# Patient Record
Sex: MALE | Race: WHITE | NOT HISPANIC OR LATINO | Employment: FULL TIME | URBAN - METROPOLITAN AREA
[De-identification: names, ages, dates, MRNs, and addresses within clinical notes are randomized per-mention and may not be internally consistent; named-entity substitution may affect disease eponyms.]

---

## 2017-05-08 ENCOUNTER — VIRTUAL VISIT (OUTPATIENT)
Dept: FAMILY MEDICINE | Facility: OTHER | Age: 47
End: 2017-05-08

## 2017-05-08 ENCOUNTER — OFFICE VISIT (OUTPATIENT)
Dept: FAMILY MEDICINE | Facility: CLINIC | Age: 47
End: 2017-05-08
Payer: COMMERCIAL

## 2017-05-08 VITALS
SYSTOLIC BLOOD PRESSURE: 134 MMHG | BODY MASS INDEX: 33.92 KG/M2 | WEIGHT: 264.3 LBS | DIASTOLIC BLOOD PRESSURE: 81 MMHG | HEIGHT: 74 IN | TEMPERATURE: 96.6 F

## 2017-05-08 DIAGNOSIS — J01.00 ACUTE NON-RECURRENT MAXILLARY SINUSITIS: Primary | ICD-10-CM

## 2017-05-08 PROCEDURE — 99213 OFFICE O/P EST LOW 20 MIN: CPT | Performed by: NURSE PRACTITIONER

## 2017-05-08 RX ORDER — AMOXICILLIN 875 MG
875 TABLET ORAL 2 TIMES DAILY
Qty: 20 TABLET | Refills: 0 | Status: SHIPPED | OUTPATIENT
Start: 2017-05-08 | End: 2022-05-19

## 2017-05-08 NOTE — PATIENT INSTRUCTIONS
Amoxicillin 1 tablet twice daily for 10 days, start in 2-3 days if not improving, or symptoms getting worse  Nasal saline irrigations  Claritin 10 mg daily  Mucinex  mg twice daily as needed for cough, take it with full glass of water, this will help cough and help sinuses too.

## 2017-05-08 NOTE — NURSING NOTE
"Chief Complaint   Patient presents with     Sinus Problem     4 days        Initial /81  Temp 96.6  F (35.9  C) (Tympanic)  Ht 6' 1.75\" (1.873 m)  Wt 264 lb 4.8 oz (119.9 kg)  BMI 34.16 kg/m2 Estimated body mass index is 34.16 kg/(m^2) as calculated from the following:    Height as of this encounter: 6' 1.75\" (1.873 m).    Weight as of this encounter: 264 lb 4.8 oz (119.9 kg).  Medication Reconciliation: complete  "

## 2017-05-08 NOTE — MR AVS SNAPSHOT
"              After Visit Summary   5/8/2017    Jun Mosqueda    MRN: [id number]           Patient Information     Date Of Birth          1970        Visit Information        Provider Department      5/8/2017 11:00 AM Luh Abreu APRN CNP [facility]        Today's Diagnoses     Acute non-recurrent maxillary sinusitis    -  1      Care Instructions    Amoxicillin 1 tablet twice daily for 10 days, start in 2-3 days if not improving, or symptoms getting worse  Nasal saline irrigations  Claritin 10 mg daily  Mucinex  mg twice daily as needed for cough, take it with full glass of water, this will help cough and help sinuses too.            Follow-ups after your visit        Who to contact     If you have questions or need follow up information about today's clinic visit or your schedule please contact [facility] directly at [phone].  Normal or non-critical lab and imaging results will be communicated to you by MyChart, letter or phone within 4 business days after the clinic has received the results. If you do not hear from us within 7 days, please contact the clinic through [email or web address] or phone. If you have a critical or abnormal lab result, we will notify you by phone as soon as possible.  Submit refill requests through InSpa or call your pharmacy and they will forward the refill request to us. Please allow 3 business days for your refill to be completed.          Additional Information About Your Visit        MyChart Information     InSpa lets you send messages to your doctor, view your test results, renew your prescriptions, schedule appointments and more. To sign up, go to [email or web address] . Click on \"Log in\" on the left side of the screen, which will take you to the Welcome page. Then click on \"Sign up Now\" on the right side of the page.     You will be asked to enter the access code listed below, as well as some personal information. Please follow " "the directions to create your username and password.     Your access code is: [email or web address]  Expires: 2017 11:21 AM     Your access code will  in 90 days. If you need help or a new code, please call your [place] clinic or [phone].        Care EveryWhere ID     This is your Care EveryWhere ID. This could be used by other organizations to access your [place] medical records  [id number]        Your Vitals Were     Temperature Height BMI (Body Mass Index)             96.6  F (35.9  C) (Tympanic) 6' 1.75\" (1.873 m) 34.16 kg/m2          Blood Pressure from Last 3 Encounters:   17 134/81   16 135/88   10/03/16 132/83    Weight from Last 3 Encounters:   17 264 lb 4.8 oz (119.9 kg)   16 267 lb 3.2 oz (121.2 kg)   10/03/16 263 lb 6.4 oz (119.5 kg)              Today, you had the following     No orders found for display         Today's Medication Changes          These changes are accurate as of: 17 11:21 AM.  If you have any questions, ask your nurse or doctor.               Start taking these medicines.        Dose/Directions    amoxicillin 875 MG tablet   Commonly known as:  AMOXIL   Used for:  Acute non-recurrent maxillary sinusitis   Started by:  Luh Abreu APRN CNP        Dose:  875 mg   Take 1 tablet (875 mg) by mouth 2 times daily   Quantity:  20 tablet   Refills:  0            Where to get your medicines      These medications were sent to [facility] [address] [place] [address]  [address] [facility] [address]     Phone:  [phone]     amoxicillin 875 MG tablet                Primary Care Provider Office Phone # Fax #    Emeka Zarco [phone]       [facility] [address] [facility] [address]        Thank you!     Thank you for choosing [facility]  for your care. Our goal is always to provide you with excellent care. Hearing back from our patients is one way we can continue " to improve our services. Please take a few minutes to complete the written survey that you may receive in the mail after your visit with us. Thank you!             Your Updated Medication List - Protect others around you: Learn how to safely use, store and throw away your medicines at www.disposemymeds.org.          This list is accurate as of: 5/8/17 11:21 AM.  Always use your most recent med list.                   Brand Name Dispense Instructions for use    amoxicillin 875 MG tablet    AMOXIL    20 tablet    Take 1 tablet (875 mg) by mouth 2 times daily

## 2017-05-08 NOTE — PROGRESS NOTES
"  SUBJECTIVE:                                                    Jun Mosqueda is a 47 year old male who presents to clinic today for the following health issues:    ENT Symptoms             Symptoms: cc Present Absent Comment   Fever/Chills  x  Chills, no fever    Fatigue  x     Muscle Aches  x  Last night    Eye Irritation   x    Sneezing   x    Nasal Brijesh/Drg  x  Runny nose    Sinus Pressure/Pain  x     Loss of smell  x     Dental pain   x    Sore Throat  x  burns   Swollen Glands   x    Ear Pain/Fullness  x  Both    Cough  x     Wheeze   x    Chest Pain   x    Shortness of breath   x    Rash   x    Other         Symptom duration: 5 days    Symptom severity:  moderate, feels worse today    Treatments tried: Ibuprofen   Contacts:        Problem list and histories reviewed & adjusted, as indicated.  Additional history: as documented    Labs reviewed in EPIC    Reviewed and updated as needed this visit by clinical staff  Tobacco  Allergies  Meds  Med Hx  Surg Hx  Fam Hx  Soc Hx      Reviewed and updated as needed this visit by Provider         ROS:  Constitutional, HEENT, cardiovascular, pulmonary, gi and gu systems are negative, except as otherwise noted.    OBJECTIVE:                                                    /81  Temp 96.6  F (35.9  C) (Tympanic)  Ht 6' 1.75\" (1.873 m)  Wt 264 lb 4.8 oz (119.9 kg)  BMI 34.16 kg/m2  Body mass index is 34.16 kg/(m^2).  GENERAL: healthy, alert and no distress  HENT: normal cephalic/atraumatic, ear canals and TM's normal, nasal mucosa edematous , rhinorrhea yellow and oropharynx clear  NECK: cervical anterior adenopathy   RESP: lungs clear to auscultation - no rales, rhonchi or wheezes  CV: regular rate and rhythm, normal S1 S2, no S3 or S4, no murmur, click or rub, no peripheral edema and peripheral pulses strong    Diagnostic Test Results:  none      ASSESSMENT/PLAN:                                                      1. Acute non-recurrent maxillary " sinusitis  - start amoxicillin (AMOXIL) 875 MG tablet; Take 1 tablet (875 mg) by mouth 2 times daily if no improvement with symptomatic treatment Dispense: 20 tablet; Refill: 0  -nasal saline irrigations  -Mucinex  mg twice daily as needed   -Tylenol 500 mg 4 times daily as needed for pain    See Patient Instructions    ROSLYN Chávez [facility]

## 2017-05-09 NOTE — PROGRESS NOTES
"Date:   Clinician: Joel Wegener  Clinician NPI: [id number]  Patient: Jun Glez  Patient : 1970  Patient Address: [address] [place] [address]  Patient Phone: [phone]  Visit Protocol: URI  Patient Summary:  Jun is a 47 year old ( : 1970 ) male who initiated a Visit for a presumed sinus infection. When asked the question \"Please sign me up to receive news, health information and promotions. \", Jun responded \"No\".     His symptoms started gradually 3-6 days ago and consist of rhinitis, cough, dysphagia, chills, malaise, ear pain, hoarse voice, nasal congestion, post-nasal drainage, and sore throat.   He denies myalgias, petechial or purpuric rash, loss of appetite, fever, dyspnea, vomiting, nausea, chest pain, headache, and itchy eyes. He denies a history of facial surgery.   His minimal nasal secretions are green. His mild facial pain or pressure feels worse when bending over or leaning forward and is located on both sides of his head. The facial pain or pressure started after the onset of other URI symptoms.  He has teeth pain and is confident the tooth pain is not from a cavity, recent dental work or other mouth problems.   In the past year Jun has been diagnosed with one (1) episodes of sinusitis.   He has a mildly painful sore throat. When Jun swallows liquids or saliva, he experiences moderate pain. The patient denies having white spots on the tonsils similar to a sample strep throat image provided. He has not been exposed to Strep. When asked to feel his neck he denied feeling enlarged lymph nodes. He denies axillary lymphadenopathy.   Regarding the ear pain, the patient denies recent injury to the area around the ear.   He reports having mild ear pain on the ear canal area of both ears for 2-4 days. The patient hears normally despite the ear pain.   In addition to the ear pain, Jun also reports having the following symptoms:      Swelling located on " his ear canal     A feeling of fullness in the ear as if it is clogged    Tinnitus     Jun denies having redness and tenderness on his ear.   Additionally, he finds the pain is worse while eating or chewing, experiences pain when gently pulling on the earlobe, finds the pain worsens if the mouth is open fully or the teeth are clenched, and does not experience pain when bending the chin to the chest.   He has never had tympanostomy tube placement.    His mild (a few coughs/hr) productive cough is NOT more bothersome at night. He believes the cough is caused by post-nasal drainage. His cough produces green sputum.   His highest temperature was 98.4 degrees Fahrenheit and his current temperature is 98.4 degrees Fahrenheit. He used the oral method for measuring his temperature.   He has passed urine in the past 12 hours. He denies rigors.   Jun denies having COPD or other chronic lung disease.   Pulse: self-reported pulse rate as: 13 beats in 10 seconds.   Current Temperature (F): 98.4     Weight (in lbs): 255   Jun does not smoke or use smokeless tobacco.    MEDICATIONS:  No current medications , ALLERGIES:  NKDA   Clinician Response:  Dear Jun,  Based on the information you have provided, you likely have a viral upper respiratory infection, otherwise known as a 'cold'. Based on the information you have provided, you likely have viral sinusitis  commonly called a head cold. Based on the information you provided, you have Otalgia, otherwise known as ear pain. If your symptoms do not improve in the next 1-2 days, please be seen in a clinic or urgent care to determine the cause of your ear pain and the best treatment plan for you.   I am prescribing benzonatate (Tessalon Perles) 100 mg to treat your cough. Take one to two tablets by mouth three times a day as needed for cough. There are no refills with this prescription.   Sinus pressure occurs when the tissues lining your sinuses become swollen and inflamed.  Afrin nasal spray decreases the swelling to provide the quickest and most effective relief from sinus pressure.  Use oxymetazoline (Afrin, or store brand) nasal spray. Spray once in each nostril twice per day for a maximum of 3 days. Using this medication more frequently or longer than recommended may cause nasal congestion to reoccur or worsen. This is an over-the-counter medication you can find at most pharmacies.   Unless your are allergic to the over-the-counter medication(s) below, I recommend using:   Ibuprofen. Take 1-3 200mg tablets (200-600mg) every 8 hours to help with the discomfort. Make sure to take the ibuprofen with food. Do not exceed 2400mg in 24 hours. This is an over-the-counter medication that does not require a prescription.   Try the following to help with your throat pain and discomfort:     Use throat lozenges    Gargle with warm salt water (1/4 teaspoon of salt per 8 ounce glass of water).    Suck on frozen items such as Popsicles or ice cubes.     Call 911 or go to the emergency room if you feel that your throat is closing off, you suddenly develop a rash, you are unable to swallow fluids, you are drooling, or you are having difficulty breathing.  Follow up with your primary care provider if your symptoms are not improving in 3-4 days.   Mild ear pain or pressure is common when you have an upper respiratory infection. The pain is caused by fluid and inflammation in your sinus passages. If your ear pain persists more than 3 days or if you notice drainage from your ears, please be seen in a clinic to get your ears examined.   Because your condition is most likely caused by a virus, antibiotics will not help you get better. Inappropriately treating a viral infection with antibiotics may cause harmful side-effects. In fact, antibiotics may make you feel worse.  For more information on why I am not prescribing antibiotics, please watch this video: Antibiotics Won't Help You.   Your symptoms are  consistent with a possible sinus infection. Most sinus infections are caused by viruses and will resolve in the next few days without antibiotics. Antibiotics are only recommended if your sinus infection is accompanied by a high temperature or persists longer than 10 days.  The duration of your sinus symptoms do not meet the criteria for a bacterial infection or antibiotic treatment. However, if you continue to have sinus pain and pressure longer than 10 days, please consider doing another visit with us for additional evaluation and treatment recommendations.   Drink plenty of liquids, especially water and take time to rest your body. This may mean taking a nap or going to bed earlier. Your body is fighting an infection and liquids and rest will improve the pace of recovery. Remember to regularly wash your hands and avoid close contact with others to prevent spreading your infection.   Diagnosis: Viral Sinusitis  Diagnosis ICD: J01.90  Prescription: benzonatate (Tessalon Perles) 100mg oral tablet 30 tablets, 5 days supply. Take one to two tablets by mouth three times a day as needed. disp. 30. Refills: 0, Refill as needed: no, Allow substitutions: yes

## 2021-01-03 ENCOUNTER — HOSPITAL ENCOUNTER (EMERGENCY)
Facility: CLINIC | Age: 51
Discharge: HOME | End: 2021-01-03
Attending: EMERGENCY MEDICINE | Admitting: EMERGENCY MEDICINE
Payer: COMMERCIAL

## 2021-01-03 VITALS
HEART RATE: 80 BPM | BODY MASS INDEX: 30.76 KG/M2 | TEMPERATURE: 96.7 F | OXYGEN SATURATION: 97 % | DIASTOLIC BLOOD PRESSURE: 76 MMHG | SYSTOLIC BLOOD PRESSURE: 110 MMHG | RESPIRATION RATE: 8 BRPM | WEIGHT: 238 LBS

## 2021-01-03 DIAGNOSIS — R20.2 PARESTHESIA OF BOTH HANDS: ICD-10-CM

## 2021-01-03 DIAGNOSIS — R03.0 ELEVATED BLOOD PRESSURE READING WITHOUT DIAGNOSIS OF HYPERTENSION: ICD-10-CM

## 2021-01-03 DIAGNOSIS — R00.2 PALPITATIONS: ICD-10-CM

## 2021-01-03 LAB
ALBUMIN SERPL-MCNC: 4.2 G/DL (ref 3.4–5)
ALP SERPL-CCNC: 110 U/L (ref 40–150)
ALT SERPL W P-5'-P-CCNC: 36 U/L (ref 0–70)
ANION GAP SERPL CALCULATED.3IONS-SCNC: 4 MMOL/L (ref 3–14)
AST SERPL W P-5'-P-CCNC: 23 U/L (ref 0–45)
BASOPHILS # BLD AUTO: 0.1 10E9/L (ref 0–0.2)
BASOPHILS NFR BLD AUTO: 0.7 %
BILIRUB SERPL-MCNC: 0.5 MG/DL (ref 0.2–1.3)
BUN SERPL-MCNC: 22 MG/DL (ref 7–30)
CALCIUM SERPL-MCNC: 9.2 MG/DL (ref 8.5–10.1)
CHLORIDE SERPL-SCNC: 107 MMOL/L (ref 94–109)
CO2 SERPL-SCNC: 28 MMOL/L (ref 20–32)
CREAT SERPL-MCNC: 1.16 MG/DL (ref 0.66–1.25)
DIFFERENTIAL METHOD BLD: NORMAL
EOSINOPHIL # BLD AUTO: 0.1 10E9/L (ref 0–0.7)
EOSINOPHIL NFR BLD AUTO: 0.7 %
ERYTHROCYTE [DISTWIDTH] IN BLOOD BY AUTOMATED COUNT: 12 % (ref 10–15)
GFR SERPL CREATININE-BSD FRML MDRD: 73 ML/MIN/{1.73_M2}
GLUCOSE SERPL-MCNC: 99 MG/DL (ref 70–99)
HCT VFR BLD AUTO: 44.5 % (ref 40–53)
HGB BLD-MCNC: 15.5 G/DL (ref 13.3–17.7)
IMM GRANULOCYTES # BLD: 0 10E9/L (ref 0–0.4)
IMM GRANULOCYTES NFR BLD: 0.1 %
LYMPHOCYTES # BLD AUTO: 2 10E9/L (ref 0.8–5.3)
LYMPHOCYTES NFR BLD AUTO: 26 %
MAGNESIUM SERPL-MCNC: 1.9 MG/DL (ref 1.6–2.3)
MCH RBC QN AUTO: 32.6 PG (ref 26.5–33)
MCHC RBC AUTO-ENTMCNC: 34.8 G/DL (ref 31.5–36.5)
MCV RBC AUTO: 94 FL (ref 78–100)
MONOCYTES # BLD AUTO: 1.1 10E9/L (ref 0–1.3)
MONOCYTES NFR BLD AUTO: 14.1 %
NEUTROPHILS # BLD AUTO: 4.5 10E9/L (ref 1.6–8.3)
NEUTROPHILS NFR BLD AUTO: 58.4 %
NRBC # BLD AUTO: 0 10*3/UL
NRBC BLD AUTO-RTO: 0 /100
PLATELET # BLD AUTO: 184 10E9/L (ref 150–450)
POTASSIUM SERPL-SCNC: 3.6 MMOL/L (ref 3.4–5.3)
PROT SERPL-MCNC: 8.3 G/DL (ref 6.8–8.8)
RBC # BLD AUTO: 4.76 10E12/L (ref 4.4–5.9)
SODIUM SERPL-SCNC: 139 MMOL/L (ref 133–144)
TROPONIN I SERPL-MCNC: <0.015 UG/L (ref 0–0.04)
TSH SERPL DL<=0.005 MIU/L-ACNC: 3.3 MU/L (ref 0.4–4)
WBC # BLD AUTO: 7.7 10E9/L (ref 4–11)

## 2021-01-03 PROCEDURE — 84484 ASSAY OF TROPONIN QUANT: CPT | Performed by: EMERGENCY MEDICINE

## 2021-01-03 PROCEDURE — 85025 COMPLETE CBC W/AUTO DIFF WBC: CPT | Performed by: EMERGENCY MEDICINE

## 2021-01-03 PROCEDURE — 80053 COMPREHEN METABOLIC PANEL: CPT | Performed by: EMERGENCY MEDICINE

## 2021-01-03 PROCEDURE — 93005 ELECTROCARDIOGRAM TRACING: CPT | Performed by: EMERGENCY MEDICINE

## 2021-01-03 PROCEDURE — 84443 ASSAY THYROID STIM HORMONE: CPT | Performed by: EMERGENCY MEDICINE

## 2021-01-03 PROCEDURE — 99285 EMERGENCY DEPT VISIT HI MDM: CPT | Mod: 25 | Performed by: EMERGENCY MEDICINE

## 2021-01-03 PROCEDURE — 83735 ASSAY OF MAGNESIUM: CPT | Performed by: EMERGENCY MEDICINE

## 2021-01-03 PROCEDURE — 99284 EMERGENCY DEPT VISIT MOD MDM: CPT | Performed by: EMERGENCY MEDICINE

## 2021-01-03 PROCEDURE — 93010 ELECTROCARDIOGRAM REPORT: CPT | Performed by: EMERGENCY MEDICINE

## 2021-01-03 RX ORDER — CLONIDINE HYDROCHLORIDE 0.1 MG/1
0.1 TABLET ORAL ONCE
Status: DISCONTINUED | OUTPATIENT
Start: 2021-01-03 | End: 2021-01-03 | Stop reason: HOSPADM

## 2021-01-03 NOTE — ED AVS SNAPSHOT
[facility]  [address] [facility] [address]  Phone: [phone]  Fax: [phone]                                    Jun Mosqueda   MRN: [id number]    Department: [facility]   Date of Visit: 1/3/2021           After Visit Summary Signature Page    I have received my discharge instructions, and my questions have been answered. I have discussed any challenges I see with this plan with the nurse or doctor.    ..........................................................................................................................................  Patient/Patient Representative Signature      ..........................................................................................................................................  Patient Representative Print Name and Relationship to Patient    ..................................................               ................................................  Date                                   Time    ..........................................................................................................................................  Reviewed by Signature/Title    ...................................................              ..............................................  Date                                               Time          22EPIC Rev 08/18

## 2021-01-03 NOTE — ED PROVIDER NOTES
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 this morning, and which was found to be associated with elevated blood pressure today.  He reports that he developed a sensation of being able to hear his heartbeat with a vague fluttery palpitations and sensation of \"whooshing\" from the chest area inferiorly throughout his body, associated with bilateral hand numbness/tingling and sweating.  This was associated with feeling anxious and when he checked his blood pressure they were elevated with systolic -195 and diastolic -115.  A 3-lead EKG study was performed and showed normal sinus rhythm.  I reviewed this and he had normal sinus rhythm with heart rate approxionely 100.  He is reports heart rate to as high as 120.  He has had no syncope, shortness of breath, cough, hemoptysis or leg pain or leg swelling.  No history of exertionally induced symptoms or chest pain.  He reports a similar brief episode several days ago, again without syncope.  He thinks that he may have PTSD.  No history of anxiety or anxiety attacks.  He denies cardiac risk factors.  No prior history of hypertension.    Allergies:  No Known Allergies    Problem List:    Patient Active Problem List    Diagnosis Date Noted     Acute appendicitis with peritoneal abscess 07/09/2013     Priority: Medium     S/P laparoscopic appendectomy 06/25/2013     Priority: Medium        Past Medical History:    No past medical history on file.    Past Surgical History:    Past Surgical History:   Procedure Laterality Date     LAPAROSCOPIC APPENDECTOMY  6/25/2013    Procedure: LAPAROSCOPIC APPENDECTOMY;  herniorrphory;  Surgeon: Philly Lynn MD;  " Location: [facility] OR       Family History:    No family history on file.    Social History:  Marital Status:   [2]  Social History     Tobacco Use     Smoking status: Never Smoker   Substance Use Topics     Alcohol use: Yes     Drug use: No        Medications:         amoxicillin (AMOXIL) 875 MG tablet          Review of Systems  As mentioned above in the history present illness.  All other systems were reviewed and are negative.    Physical Exam   BP: (!) 152/101  Pulse: 105  Temp: 96.7  F (35.9  C)  Resp: 20  Weight: 108 kg (238 lb)  SpO2: 99 %      Physical Exam  Vitals signs and nursing note reviewed.   Constitutional:       General: He is not in acute distress.     Appearance: Normal appearance. He is well-developed. He is not ill-appearing or diaphoretic.   HENT:      Head: Normocephalic and atraumatic.      Right Ear: External ear normal.      Left Ear: External ear normal.   Eyes:      General: No scleral icterus.     Extraocular Movements: Extraocular movements intact.      Conjunctiva/sclera: Conjunctivae normal.   Neck:      Musculoskeletal: Normal range of motion and neck supple.      Trachea: No tracheal deviation.   Cardiovascular:      Rate and Rhythm: Normal rate and regular rhythm.      Pulses: Normal pulses.      Heart sounds: Normal heart sounds. No murmur. No friction rub. No gallop.    Pulmonary:      Effort: Pulmonary effort is normal. No tachypnea or respiratory distress.      Breath sounds: Normal breath sounds. No stridor. No decreased breath sounds, wheezing, rhonchi or rales.   Abdominal:      General: There is no distension.      Palpations: Abdomen is soft.      Tenderness: There is no abdominal tenderness.   Musculoskeletal: Normal range of motion.         General: No tenderness.      Right lower leg: No edema.      Left lower leg: No edema.   Skin:     General: Skin is warm and dry.      Coloration: Skin is not pale.      Findings: No erythema or rash.   Neurological:      General:  No focal deficit present.      Mental Status: He is alert and oriented to person, place, and time.      Coordination: Coordination normal.   Psychiatric:         Behavior: Behavior normal.      Comments: Anxious affect.         ED Course        Procedures               EKG Interpretation:    Interpreted by Jaskaran Iverson MD  Time reviewed: Upon completion  Symptoms at time of EKG: Palpitations  Rhythm: normal sinus   Rate: normal  Axis: normal  Ectopy: none  Conduction: normal  ST Segments/ T Waves: No ST-T wave changes  Q Waves: none  Comparison to prior: No old EKG available  Clinical Impression: normal EKG         Results for orders placed or performed during the hospital encounter of 01/03/21 (from the past 24 hour(s))   CBC with platelets differential   Result Value Ref Range    WBC 7.7 4.0 - 11.0 10e9/L    RBC Count 4.76 4.4 - 5.9 10e12/L    Hemoglobin 15.5 13.3 - 17.7 g/dL    Hematocrit 44.5 40.0 - 53.0 %    MCV 94 78 - 100 fl    MCH 32.6 26.5 - 33.0 pg    MCHC 34.8 31.5 - 36.5 g/dL    RDW 12.0 10.0 - 15.0 %    Platelet Count 184 150 - 450 10e9/L    Diff Method Automated Method     % Neutrophils 58.4 %    % Lymphocytes 26.0 %    % Monocytes 14.1 %    % Eosinophils 0.7 %    % Basophils 0.7 %    % Immature Granulocytes 0.1 %    Nucleated RBCs 0 0 /100    Absolute Neutrophil 4.5 1.6 - 8.3 10e9/L    Absolute Lymphocytes 2.0 0.8 - 5.3 10e9/L    Absolute Monocytes 1.1 0.0 - 1.3 10e9/L    Absolute Eosinophils 0.1 0.0 - 0.7 10e9/L    Absolute Basophils 0.1 0.0 - 0.2 10e9/L    Abs Immature Granulocytes 0.0 0 - 0.4 10e9/L    Absolute Nucleated RBC 0.0    Comprehensive metabolic panel   Result Value Ref Range    Sodium 139 133 - 144 mmol/L    Potassium 3.6 3.4 - 5.3 mmol/L    Chloride 107 94 - 109 mmol/L    Carbon Dioxide 28 20 - 32 mmol/L    Anion Gap 4 3 - 14 mmol/L    Glucose 99 70 - 99 mg/dL    Urea Nitrogen 22 7 - 30 mg/dL    Creatinine 1.16 0.66 - 1.25 mg/dL    GFR Estimate 73 >60 mL/min/[1.73_m2]    GFR  Estimate If Black 85 >60 mL/min/[1.73_m2]    Calcium 9.2 8.5 - 10.1 mg/dL    Bilirubin Total 0.5 0.2 - 1.3 mg/dL    Albumin 4.2 3.4 - 5.0 g/dL    Protein Total 8.3 6.8 - 8.8 g/dL    Alkaline Phosphatase 110 40 - 150 U/L    ALT 36 0 - 70 U/L    AST 23 0 - 45 U/L   Troponin I   Result Value Ref Range    Troponin I ES <0.015 0.000 - 0.045 ug/L   Magnesium   Result Value Ref Range    Magnesium 1.9 1.6 - 2.3 mg/dL   TSH with free T4 reflex   Result Value Ref Range    TSH 3.30 0.40 - 4.00 mU/L       Medications   cloNIDine (CATAPRES) tablet 0.1 mg (0 mg Oral Hold 1/3/21 9316)       Assessments & Plan (with Medical Decision Making)   50-year-old male with no prior history of cardiac disease or known risk factors for cardiac disease, hypertension or prior history of anxiety who had an episode of palpitations, bilateral hand paresthesias and anxiety associated with elevated blood pressure today.  EKG troponin and laboratory evaluation unremarkable. Blood pressure normalized spontaneously.  He appears stable for discharge home with no dysrhythmia or ectopy seen on cardiac monitoring in the emergency department. ?  Anxiety induced symptoms.  Doubt atypical ACS, PE/DVT, aneurysm/dissection or ventricular dysrhythmia or emergent disease process.  Will arrange for an outpatient Holter monitor study and have him follow-up in primary care clinic this coming week.  He return for worsening or recurrent symptoms, or new problems or concerns.    I have reviewed the nursing notes.    I have reviewed the findings, diagnosis, plan and need for follow up with the patient    New Prescriptions    No medications on file       Final diagnoses:   Palpitations   Paresthesia of both hands   Elevated blood pressure reading without diagnosis of hypertension       1/3/2021   [facility] EMERGENCY DEPT         Jaskaran Iverson MD  01/03/21 1423

## 2021-01-03 NOTE — ED NOTES
"Pt presents to ED with complaints of chest pain.  Pt states that it is an \"ache\" rates it at a 3/10.  Had an episode of SOA earlier.  None at this time.  Pt is A&Ox4.  HTN upon arrival.  VSS. No cardiac history.   "

## 2021-01-05 ENCOUNTER — HOSPITAL ENCOUNTER (OUTPATIENT)
Dept: CARDIOLOGY | Facility: CLINIC | Age: 51
Discharge: HOME | End: 2021-01-05
Attending: EMERGENCY MEDICINE | Admitting: EMERGENCY MEDICINE
Payer: COMMERCIAL

## 2021-01-05 DIAGNOSIS — R00.2 PALPITATIONS: ICD-10-CM

## 2021-01-05 PROCEDURE — 93227 XTRNL ECG REC<48 HR R&I: CPT | Performed by: INTERNAL MEDICINE

## 2021-01-05 PROCEDURE — 93225 XTRNL ECG REC<48 HRS REC: CPT

## 2022-05-09 ENCOUNTER — HOSPITAL ENCOUNTER (EMERGENCY)
Facility: CLINIC | Age: 52
Discharge: HOME | End: 2022-05-09
Attending: FAMILY MEDICINE | Admitting: FAMILY MEDICINE
Payer: COMMERCIAL

## 2022-05-09 VITALS
TEMPERATURE: 98.4 F | WEIGHT: 256 LBS | HEART RATE: 92 BPM | DIASTOLIC BLOOD PRESSURE: 115 MMHG | OXYGEN SATURATION: 98 % | RESPIRATION RATE: 20 BRPM | SYSTOLIC BLOOD PRESSURE: 169 MMHG | HEIGHT: 74 IN | BODY MASS INDEX: 32.85 KG/M2

## 2022-05-09 DIAGNOSIS — S39.012A LOW BACK STRAIN, INITIAL ENCOUNTER: ICD-10-CM

## 2022-05-09 DIAGNOSIS — R03.0 ELEVATED BLOOD PRESSURE READING WITHOUT DIAGNOSIS OF HYPERTENSION: ICD-10-CM

## 2022-05-09 PROCEDURE — 99284 EMERGENCY DEPT VISIT MOD MDM: CPT | Performed by: FAMILY MEDICINE

## 2022-05-09 PROCEDURE — 99283 EMERGENCY DEPT VISIT LOW MDM: CPT | Performed by: FAMILY MEDICINE

## 2022-05-09 RX ORDER — SULINDAC 200 MG/1
200 TABLET ORAL 2 TIMES DAILY WITH MEALS
Qty: 20 TABLET | Refills: 0 | Status: SHIPPED | OUTPATIENT
Start: 2022-05-09 | End: 2022-05-19

## 2022-05-09 RX ORDER — CYCLOBENZAPRINE HCL 10 MG
10 TABLET ORAL
Qty: 10 TABLET | Refills: 0 | Status: SHIPPED | OUTPATIENT
Start: 2022-05-09 | End: 2022-05-09

## 2022-05-09 RX ORDER — CYCLOBENZAPRINE HCL 10 MG
10 TABLET ORAL
Qty: 10 TABLET | Refills: 0 | Status: SHIPPED | OUTPATIENT
Start: 2022-05-09 | End: 2022-05-19

## 2022-05-09 NOTE — ED TRIAGE NOTES
Back pain started this am has worsened denies bowel or bladder issues denies numbness or tingling      Triage Assessment     Row Name 05/09/22 1634       Triage Assessment (Adult)    Airway WDL WDL       Respiratory WDL    Respiratory WDL WDL       Skin Circulation/Temperature WDL    Skin Circulation/Temperature WDL WDL       Cardiac WDL    Cardiac WDL WDL       Peripheral/Neurovascular WDL    Peripheral Neurovascular WDL WDL       Cognitive/Neuro/Behavioral WDL    Cognitive/Neuro/Behavioral WDL WDL

## 2022-05-09 NOTE — DISCHARGE INSTRUCTIONS
ICD-10-CM    1. Low back strain, initial encounter  S39.012A Primary Care Referral    take sulindac twice daiuly for the next few days. tylenol 1000 mg every 6 hours as needed. do not use sulindac with ibuprofen or similar other nsaids.  maintaiun range of motion., note for work.  Follow-up primary provider.  [facility].  consider macenzie heal your own back.  return for numb inner thigh, foot drop, incontinence/retention urine/stool.   2. Elevated blood pressure reading without diagnosis of hypertension  R03.0 Primary Care Referral    eval in clinic. recheck at home and log bring to follow-yup.  follow DASH diet. consider sleep apnea, decrease alcohoi.  consider labs and med

## 2022-05-09 NOTE — ED NOTES
Getting ready for work this morning & felt a pop to right lower back with pain following after. Denies paresthesias or pain down leg. Denies changes/ loss of B&B.

## 2022-05-09 NOTE — ED PROVIDER NOTES
History     Chief Complaint   Patient presents with     Back Pain     HPI  Jun Glez is a 52 year old male who presents with a history of appendicitis and peritoneal abscess.  2013.  Arrives here with back pain.  No associated bowel or bladder changes no associated numbness or paresthesias.  Onset this morning.  Seen last for back pain in 2015.    He tells me that this morning he was inverting his head up with his leg up when he had a sudden pop in his right low back.  There was no radicular symptoms into the legs.  No pain into the legs.  No paresthesias.  Since then extension is more painful but rotation lateral bending and forward flexion is not severe.  He had no falls.  No recent heavy lifting at work.  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  But yesterday he was at home.     He was triaged to the emergency department because of elevated blood pressure up to 180.  He tells me that his PTSD symptoms are worse today and the pain is also exacerbating his pain.  His typical blood pressures in the 140s over 80s diastolic.  He tells me he also does have snoring has thought he might have sleep apnea.  He does use excessive alcohol as well.  He is not on antihypertensives.  He has no secondary symptoms related to his elevated blood pressure.  There is no chest pain shortness of breath palpitations or other systemic symptoms.      Allergies:  No Known Allergies    Problem List:    Patient Active Problem List    Diagnosis Date Noted     Acute appendicitis with peritoneal abscess 07/09/2013     Priority: Medium     S/P laparoscopic appendectomy 06/25/2013     Priority: Medium        Past Medical History:    No past medical history on file.    Past Surgical History:    Past Surgical History:   Procedure Laterality Date     LAPAROSCOPIC APPENDECTOMY  6/25/2013    Procedure: LAPAROSCOPIC APPENDECTOMY;  herniorrphory;  Surgeon: Philly Lynn MD;  Location: [facility] OR       Family History:    No family history  "on file.    Social History:  Marital Status:   [2]  Social History     Tobacco Use     Smoking status: Never Smoker   Substance Use Topics     Alcohol use: Yes     Drug use: No        Medications:    amoxicillin (AMOXIL) 875 MG tablet          Review of Systems     ROS:  5 point ROS negative except as noted above in HPI, including Gen., Resp., CV, GI &  system review.      Physical Exam   BP: (!) 173/113  Pulse: 90  Temp: 98.4  F (36.9  C)  Resp: 20  Height: 188 cm (6' 2\")  Weight: 116.1 kg (256 lb)  SpO2: 96 %      Physical Exam    Low back exam reveals tenderness to palpation over the left dorsi region right side.  There is no midline tenderness to palpation.  Forward flexion is normal he tells me but does not go through with motion.  He has difficulty with full extension.  Lateral bending and rotation are witnessed and they are not reduced.  He has no tenderness palpation over the SI joint or over the sciatic notch.  1 legged extension is not done fully but does not exacerbate pain.  Figure-of-four testing also is negative.  Straight leg raise is negative.  Reflexes are 2+ at bilateral patella.  He has full motor strength lower extremities.  Intact distal sensation.    His abdomen is nontender.  His lungs are clear to auscultation.    ED Course                 Procedures              Critical Care time:  none               No results found for this or any previous visit (from the past 24 hour(s)).    Medications - No data to display     Patient Vitals for the past 24 hrs:   BP Temp Temp src Pulse Resp SpO2 Height Weight   05/09/22 1840 (!) 169/115 -- -- 92 -- 98 % -- --   05/09/22 1810 (!) 187/105 -- -- 89 -- -- -- --   05/09/22 1805 (!) 174/112 -- -- -- -- -- -- --   05/09/22 1735 (!) 187/109 -- -- 93 -- 97 % -- --   05/09/22 1633 (!) 173/113 98.4  F (36.9  C) Oral 90 20 96 % 1.88 m (6' 2\") 116.1 kg (256 lb)         Assessments & Plan (with Medical Decision Making)       MDM: Jun Glez is a 52 " year old male presenting with low back pain after change in position at home.  Reassuring history and exam today most suggestive of musculoskeletal cause.  Discussed precautions for return.  We did discuss also the elevated blood pressures and need for recheck in clinic.  However blood pressure today is incidental, and this is asymptomatic hypertension.    I have reviewed the nursing notes.    I have reviewed the findings, diagnosis, plan and need for follow up with the patient.       New Prescriptions    No medications on file       Final diagnoses:   Low back strain, initial encounter - take sulindac twice daiuly for the next few days. tylenol 1000 mg every 6 hours as needed. do not use sulindac with ibuprofen or similar other nsaids.  maintaiun range of motion., note for work.  Follow-up primary provider.  [facility].  consider macenzie heal your own back.  return for numb inner thigh, foot drop, incontinence/retention urine/stool.   Elevated blood pressure reading without diagnosis of hypertension - eval in clinic. recheck at home and log bring to follow-yup.  follow DASH diet. consider sleep apnea, decrease alcohoi.  consider labs and med       5/9/2022   [facility] EMERGENCY DEPT     Randy Booth MD  05/10/22 4355

## 2022-05-09 NOTE — Clinical Note
Jun Glez was seen and treated in our emergency department on 5/9/2022.  He may return to work on 05/11/2022.  no lifting >10 pounds, no push poull >20 lb, avoid bending, stooping, or overhead work.  re-eval in clinic within 7 days if restrictions need to be extended - otherwisde no restrictions after 7 days. change position for comfort.     If you have any questions or concerns, please don't hesitate to call.      Randy Booth MD

## 2022-05-19 ENCOUNTER — OFFICE VISIT (OUTPATIENT)
Dept: FAMILY MEDICINE | Facility: CLINIC | Age: 52
End: 2022-05-19
Payer: COMMERCIAL

## 2022-05-19 VITALS
HEART RATE: 90 BPM | TEMPERATURE: 97.8 F | DIASTOLIC BLOOD PRESSURE: 80 MMHG | RESPIRATION RATE: 16 BRPM | OXYGEN SATURATION: 97 % | BODY MASS INDEX: 35.34 KG/M2 | WEIGHT: 275.4 LBS | SYSTOLIC BLOOD PRESSURE: 112 MMHG | HEIGHT: 74 IN

## 2022-05-19 DIAGNOSIS — S39.012A LOW BACK STRAIN, INITIAL ENCOUNTER: Primary | ICD-10-CM

## 2022-05-19 DIAGNOSIS — R03.0 ELEVATED BLOOD PRESSURE READING WITHOUT DIAGNOSIS OF HYPERTENSION: ICD-10-CM

## 2022-05-19 DIAGNOSIS — Z23 NEED FOR VACCINATION: ICD-10-CM

## 2022-05-19 DIAGNOSIS — Z11.59 NEED FOR HEPATITIS C SCREENING TEST: ICD-10-CM

## 2022-05-19 DIAGNOSIS — F41.1 GAD (GENERALIZED ANXIETY DISORDER): ICD-10-CM

## 2022-05-19 DIAGNOSIS — Z11.4 SCREENING FOR HIV (HUMAN IMMUNODEFICIENCY VIRUS): ICD-10-CM

## 2022-05-19 DIAGNOSIS — Z13.220 SCREENING FOR HYPERLIPIDEMIA: ICD-10-CM

## 2022-05-19 DIAGNOSIS — Z12.11 SCREEN FOR COLON CANCER: ICD-10-CM

## 2022-05-19 DIAGNOSIS — F43.10 PTSD (POST-TRAUMATIC STRESS DISORDER): ICD-10-CM

## 2022-05-19 PROCEDURE — 96127 BRIEF EMOTIONAL/BEHAV ASSMT: CPT | Performed by: FAMILY MEDICINE

## 2022-05-19 PROCEDURE — 90714 TD VACC NO PRESV 7 YRS+ IM: CPT | Performed by: FAMILY MEDICINE

## 2022-05-19 PROCEDURE — 99204 OFFICE O/P NEW MOD 45 MIN: CPT | Mod: 25 | Performed by: FAMILY MEDICINE

## 2022-05-19 PROCEDURE — 90471 IMMUNIZATION ADMIN: CPT | Performed by: FAMILY MEDICINE

## 2022-05-19 RX ORDER — BUSPIRONE HYDROCHLORIDE 15 MG/1
15 TABLET ORAL 2 TIMES DAILY
COMMUNITY
Start: 2022-04-27 | End: 2022-06-09

## 2022-05-19 RX ORDER — CLONAZEPAM 0.5 MG/1
TABLET ORAL
COMMUNITY
Start: 2022-02-09 | End: 2022-09-13 | Stop reason: ALTCHOICE

## 2022-05-19 ASSESSMENT — PATIENT HEALTH QUESTIONNAIRE - PHQ9
SUM OF ALL RESPONSES TO PHQ QUESTIONS 1-9: 14
SUM OF ALL RESPONSES TO PHQ QUESTIONS 1-9: 14
10. IF YOU CHECKED OFF ANY PROBLEMS, HOW DIFFICULT HAVE THESE PROBLEMS MADE IT FOR YOU TO DO YOUR WORK, TAKE CARE OF THINGS AT HOME, OR GET ALONG WITH OTHER PEOPLE: SOMEWHAT DIFFICULT

## 2022-05-19 ASSESSMENT — PAIN SCALES - GENERAL: PAINLEVEL: NO PAIN (0)

## 2022-05-19 NOTE — PROGRESS NOTES
"  Assessment & Plan     Low back strain, initial encounter  Resolved per patient.  Advised safe and appropriate use of cyclobenzprine. He said he did not need a refill.  Activity as tolerated.  Advised spasms may recur intemittently in next few weeks.  Return precautions discussed and given to patient.   - Primary Care Referral    Elevated blood pressure reading without diagnosis of hypertension  Within goal on recheck.  Advised salt restriction.  Return precautions discussed and given to patient.   - Primary Care Referral    PTSD (post-traumatic stress disorder)  JULIENNE (generalized anxiety disorder)  Recurrent symptoms still.  Patient requested referral to [facility] mental health care team  - Adult Mental Health  Referral      - Adult Mental Health  Referral    Screen for colon cancer  Screening for hyperlipidemia  Screening for HIV (human immunodeficiency virus)  Need for hepatitis C screening test  Patient preferred to shcedule a wellness visit to address these.    Need for vaccination  - TD PRSERV FREE >=7 YRS ADS IM [[id number]]  56}     BMI:   Estimated body mass index is 35.36 kg/m  as calculated from the following:    Height as of this encounter: 1.88 m (6' 2\").    Weight as of this encounter: 124.9 kg (275 lb 6.4 oz).   Weight management plan: Discussed healthy diet and exercise guidelines    Depression Screening Follow Up    PHQ 5/19/2022   PHQ-9 Total Score 14   Q9: Thoughts of better off dead/self-harm past 2 weeks Not at all     Last PHQ-9 5/19/2022   1.  Little interest or pleasure in doing things 2   2.  Feeling down, depressed, or hopeless 2   3.  Trouble falling or staying asleep, or sleeping too much 3   4.  Feeling tired or having little energy 2   5.  Poor appetite or overeating 1   6.  Feeling bad about yourself 2   7.  Trouble concentrating 1   8.  Moving slowly or restless 1   Q9: Thoughts of better off dead/self-harm past 2 weeks 0   PHQ-9 Total Score 14       Follow Up Actions " Taken  Mental Health Referral placed     Patient Instructions   Blood pressure today is in normal range.  Be consistent with low salt, low trans fat and low saturated fat diet.  Eat food rich in omega-3-fatty acids as you tolerate. (salmon, olive oil)  Eat 5 cups of vegetables, fruits and whole grains per day.  Limit starchy food (white rice, white bread, white pasta, white potatoes) to less than a cup per meal.  Minimize sweets, junk food and fastfood. Limit soda beverages to one serving per day; best to avoid it altogether though.    Exercise: moderate intensity sustained for at least 30 mins per episode, goal of 150 mins per week at least  Combine cardiovascular and resistance exercises.  These exercise recommendations are in addition to your daily activity at work or home.  Work on losing weight.    You reported back pain is now resolved.  Take cyclobenzaprine only for spasms of the back, and do not drink alcohol with it, nor drive if taking it.        Return in about 1 month (around 6/19/2022) for In-clinic visit for wellness exam.    Kahlil Denny MD  [facility] Barahona is a 52 year old who presents for the following health issues   Chief Complaint   Patient presents with     ER F/U     Pt being seen for post e/r follow up.  Pt also had elevated b/p's while being seen.       HPI     ED/UC Followup:    Facility:  [facility]  Date of visit: 5/9/22  Reason for visit: low back strain  Current Status: pt doing much better  Patient said cyclobenzaprine has been helpful. Did not take it scheduled - only when need for spasm.        BP was high at the ER - was in pain though.  Patient also has anxiety and PTSD - following with Matthew. He states he stil has recurring symptoms. He said he took a genomic test and was found that buspar would be his best choice for treatment. However, patient said he has not had significant improvement with the med. He would like referral to  "[facility].  Denies chest pain, dyspnea, HA, BOV, dizziness or urinary changes.    Review of Systems   Constitutional, HEENT, cardiovascular, pulmonary, GI, , musculoskeletal, neuro, skin, endocrine and psych systems are negative, except as otherwise noted.      Objective    BP (!) 118/90   Pulse 90   Temp 97.8  F (36.6  C) (Tympanic)   Resp 16   Ht 1.88 m (6' 2\")   Wt 124.9 kg (275 lb 6.4 oz)   SpO2 97%   BMI 35.36 kg/m    Body mass index is 35.36 kg/m .  Physical Exam   GENERAL: obese,  alert and no distress  NECK: full range of motion grossly  MS: extremities- no gross deformities noted, no edema  SKIN: no suspicious lesions, no rashes  CV: normal rate, regular rhythm, no murmur  LUNGS: clear to auscultation bilaterally   NEURO: strength and tone- normal, sensory exam- grossly normal, reflexes- symmetric  BACK: deferred per patient   PSYCH: normal mood, appropriate affect, linear thought process    No results found for any visits on 05/19/22.        Answers for HPI/ROS submitted by the patient on 5/19/2022  If you checked off any problems, how difficult have these problems made it for you to do your work, take care of things at home, or get along with other people?: Somewhat difficult  PHQ9 TOTAL SCORE: 14      "

## 2022-05-19 NOTE — PATIENT INSTRUCTIONS
Blood pressure today is in normal range.  Be consistent with low salt, low trans fat and low saturated fat diet.  Eat food rich in omega-3-fatty acids as you tolerate. (salmon, olive oil)  Eat 5 cups of vegetables, fruits and whole grains per day.  Limit starchy food (white rice, white bread, white pasta, white potatoes) to less than a cup per meal.  Minimize sweets, junk food and fastfood. Limit soda beverages to one serving per day; best to avoid it altogether though.    Exercise: moderate intensity sustained for at least 30 mins per episode, goal of 150 mins per week at least  Combine cardiovascular and resistance exercises.  These exercise recommendations are in addition to your daily activity at work or home.  Work on losing weight.    You reported back pain is now resolved.  Take cyclobenzaprine only for spasms of the back, and do not drink alcohol with it, nor drive if taking it.

## 2022-05-19 NOTE — NURSING NOTE
Prior to immunization administration, verified patients identity using patient s name and date of birth. Please see Immunization Activity for additional information.     Screening Questionnaire for Adult Immunization    Are you sick today?   No   Do you have allergies to medications, food, a vaccine component or latex?   No   Have you ever had a serious reaction after receiving a vaccination?   No   Do you have a long-term health problem with heart, lung, kidney, or metabolic disease (e.g., diabetes), asthma, a blood disorder, no spleen, complement component deficiency, a cochlear implant, or a spinal fluid leak?  Are you on long-term aspirin therapy?   No   Do you have cancer, leukemia, HIV/AIDS, or any other immune system problem?   No   Do you have a parent, brother, or sister with an immune system problem?   No   In the past 3 months, have you taken medications that affect  your immune system, such as prednisone, other steroids, or anticancer drugs; drugs for the treatment of rheumatoid arthritis, Crohn s disease, or psoriasis; or have you had radiation treatments?   No   Have you had a seizure, or a brain or other nervous system problem?   No   During the past year, have you received a transfusion of blood or blood    products, or been given immune (gamma) globulin or antiviral drug?   No   For women: Are you pregnant or is there a chance you could become       pregnant during the next month?   No   Have you received any vaccinations in the past 4 weeks?   No     Immunization questionnaire answers were all negative.        Per orders of Dr. Denny, injection of TD given by Dora Velazquez CMA. Patient instructed to remain in clinic for 15 minutes afterwards, and to report any adverse reaction to me immediately.       Screening performed by Dora Velazquez CMA on 5/19/2022 at 9:32 AM.

## 2022-06-08 ASSESSMENT — ENCOUNTER SYMPTOMS
EYE PAIN: 0
CONSTIPATION: 0
MYALGIAS: 0
SORE THROAT: 0
PARESTHESIAS: 0
DIARRHEA: 0
COUGH: 0
NAUSEA: 0
FREQUENCY: 0
DYSURIA: 0
HEADACHES: 0
ABDOMINAL PAIN: 0
CHILLS: 0
JOINT SWELLING: 0
HEMATOCHEZIA: 0
NERVOUS/ANXIOUS: 1
SHORTNESS OF BREATH: 1
PALPITATIONS: 1
WEAKNESS: 0
DIZZINESS: 0
FEVER: 0
HEARTBURN: 0
ARTHRALGIAS: 0
HEMATURIA: 0

## 2022-06-08 ASSESSMENT — PATIENT HEALTH QUESTIONNAIRE - PHQ9
SUM OF ALL RESPONSES TO PHQ QUESTIONS 1-9: 10
10. IF YOU CHECKED OFF ANY PROBLEMS, HOW DIFFICULT HAVE THESE PROBLEMS MADE IT FOR YOU TO DO YOUR WORK, TAKE CARE OF THINGS AT HOME, OR GET ALONG WITH OTHER PEOPLE: SOMEWHAT DIFFICULT
SUM OF ALL RESPONSES TO PHQ QUESTIONS 1-9: 10

## 2022-06-09 ENCOUNTER — OFFICE VISIT (OUTPATIENT)
Dept: FAMILY MEDICINE | Facility: CLINIC | Age: 52
End: 2022-06-09
Payer: COMMERCIAL

## 2022-06-09 VITALS
HEART RATE: 86 BPM | BODY MASS INDEX: 34.75 KG/M2 | OXYGEN SATURATION: 98 % | DIASTOLIC BLOOD PRESSURE: 80 MMHG | HEIGHT: 74 IN | SYSTOLIC BLOOD PRESSURE: 116 MMHG | WEIGHT: 270.8 LBS | RESPIRATION RATE: 16 BRPM | TEMPERATURE: 97.5 F

## 2022-06-09 DIAGNOSIS — Z00.00 ROUTINE GENERAL MEDICAL EXAMINATION AT A HEALTH CARE FACILITY: Primary | ICD-10-CM

## 2022-06-09 DIAGNOSIS — F41.1 GAD (GENERALIZED ANXIETY DISORDER): ICD-10-CM

## 2022-06-09 DIAGNOSIS — Z11.59 NEED FOR HEPATITIS C SCREENING TEST: ICD-10-CM

## 2022-06-09 DIAGNOSIS — Z12.11 SCREENING FOR MALIGNANT NEOPLASM OF COLON: ICD-10-CM

## 2022-06-09 DIAGNOSIS — Z11.4 SCREENING FOR HUMAN IMMUNODEFICIENCY VIRUS: ICD-10-CM

## 2022-06-09 DIAGNOSIS — E78.2 MIXED HYPERLIPIDEMIA: ICD-10-CM

## 2022-06-09 DIAGNOSIS — R03.0 ELEVATED BLOOD PRESSURE READING WITHOUT DIAGNOSIS OF HYPERTENSION: ICD-10-CM

## 2022-06-09 DIAGNOSIS — Z13.220 LIPID SCREENING: ICD-10-CM

## 2022-06-09 LAB
CHOLEST SERPL-MCNC: 231 MG/DL
FASTING STATUS PATIENT QL REPORTED: YES
HCV AB SERPL QL IA: NONREACTIVE
HDLC SERPL-MCNC: 58 MG/DL
HIV 1+2 AB+HIV1 P24 AG SERPL QL IA: NONREACTIVE
LDLC SERPL CALC-MCNC: 157 MG/DL
NONHDLC SERPL-MCNC: 173 MG/DL
TRIGL SERPL-MCNC: 78 MG/DL

## 2022-06-09 PROCEDURE — 99214 OFFICE O/P EST MOD 30 MIN: CPT | Mod: 25 | Performed by: FAMILY MEDICINE

## 2022-06-09 PROCEDURE — 87389 HIV-1 AG W/HIV-1&-2 AB AG IA: CPT | Performed by: FAMILY MEDICINE

## 2022-06-09 PROCEDURE — 36415 COLL VENOUS BLD VENIPUNCTURE: CPT | Performed by: FAMILY MEDICINE

## 2022-06-09 PROCEDURE — 99396 PREV VISIT EST AGE 40-64: CPT | Performed by: FAMILY MEDICINE

## 2022-06-09 PROCEDURE — 86803 HEPATITIS C AB TEST: CPT | Performed by: FAMILY MEDICINE

## 2022-06-09 PROCEDURE — 80061 LIPID PANEL: CPT | Performed by: FAMILY MEDICINE

## 2022-06-09 RX ORDER — MAGNESIUM OXIDE 400 MG/1
400 TABLET ORAL DAILY
COMMUNITY
End: 2023-03-28

## 2022-06-09 RX ORDER — BUSPIRONE HYDROCHLORIDE 15 MG/1
TABLET ORAL
Start: 2022-06-09 | End: 2022-06-13

## 2022-06-09 ASSESSMENT — ENCOUNTER SYMPTOMS
FREQUENCY: 0
FEVER: 0
CHILLS: 0
COUGH: 0
HEMATOCHEZIA: 0
JOINT SWELLING: 0
NAUSEA: 0
MYALGIAS: 0
ARTHRALGIAS: 0
HEARTBURN: 0
EYE PAIN: 0
WEAKNESS: 0
PALPITATIONS: 1
PARESTHESIAS: 0
HEADACHES: 0
DIARRHEA: 0
SORE THROAT: 0
ABDOMINAL PAIN: 0
SHORTNESS OF BREATH: 1
DIZZINESS: 0
CONSTIPATION: 0
NERVOUS/ANXIOUS: 1
DYSURIA: 0
HEMATURIA: 0

## 2022-06-09 ASSESSMENT — PAIN SCALES - GENERAL: PAINLEVEL: NO PAIN (0)

## 2022-06-09 NOTE — PATIENT INSTRUCTIONS
Increase buspar to 30 mg in the morning and 15 mg in evening.  Send an Evisit in [facility] in 2 weeks to follow up on how this is going.  See mental health provider as scheduled.    Low salt diet.  Manage weight.    Be consistent with low trans fat and saturated fat diet.  Eat food rich in omega-3-fatty acids as you tolerate. (salmon, olive oil)  Eat 5 cups of vegetables, fruits and whole grains per day.  Limit starchy food (white rice, white bread, white pasta, white potatoes) to less than a cup per meal.  Minimize sweets, junk food and fastfood. Limit soda beverages to one serving per day; best to avoid it altogether though.  Exercise: moderate intensity sustained for at least 30 mins per episode, goal of 150 mins per week at least  Combine cardiovascular and resistance exercises.  These exercise recommendations are in addition to your daily activity at work or home.  Work on losing weight.    Cologuard will be mailed to you.    You will be contacted in 1-2 days for results of your lab tests.     Preventative Care Visits include: Yearly physicals, Well-child visits, Welcome to Medicare visits, & Medicare yearly wellness exams.    The purpose of these visits is to discuss your medical history and prevent health problems before you are sick.  You may need to pay a copay, coinsurance or deductible if your visit today includes testing or treating for a new or existing condition.    Additional charges may be incurred for today's visit. If you have questions about what your insurance plan covers, please contact your Insurance Company's member service department.  If you have questions specific to a bill you have already received from [employer], please contact the [facility] Billing Office at [phone].      Preventive Health Recommendations  Male Ages 50 - 64    Yearly exam:             See your health care provider every year in order to  o   Review health changes.   o   Discuss preventive care.    o   Review your  medicines if your doctor has prescribed any.   Have a cholesterol test every 5 years, or more frequently if you are at risk for high cholesterol/heart disease.   Have a diabetes test (fasting glucose) every three years. If you are at risk for diabetes, you should have this test more often.   Have a colonoscopy at age 50, or have a yearly FIT test (stool test). These exams will check for colon cancer.    Talk with your health care provider about whether or not a prostate cancer screening test (PSA) is right for you.  You should be tested each year for STDs (sexually transmitted diseases), if you re at risk.     Shots: Get a flu shot each year. Get a tetanus shot every 10 years.     Nutrition:  Eat at least 5 servings of fruits and vegetables daily.   Eat whole-grain bread, whole-wheat pasta and brown rice instead of white grains and rice.   Get adequate Calcium and Vitamin D.     Lifestyle  Exercise for at least 150 minutes a week (30 minutes a day, 5 days a week). This will help you control your weight and prevent disease.   Limit alcohol to one drink per day.   No smoking.   Wear sunscreen to prevent skin cancer.   See your dentist every six months for an exam and cleaning.   See your eye doctor every 1 to 2 years.

## 2022-06-09 NOTE — PROGRESS NOTES
SUBJECTIVE:   CC: Jun Glez is an 52 year old male who presents for preventative health visit.       Patient has been advised of split billing requirements and indicates understanding: Yes  Healthy Habits:     Getting at least 3 servings of Calcium per day:  Yes    Bi-annual eye exam:  NO    Dental care twice a year:  Yes    Sleep apnea or symptoms of sleep apnea:  None    Diet:  Carbohydrate counting and Breakfast skipped    Frequency of exercise:  2-3 days/week    Duration of exercise:  30-45 minutes    Taking medications regularly:  Yes    Medication side effects:  None    PHQ-2 Total Score: 3    Additional concerns today:  No    Patient is concerned he gets higher blood pressure random times a day.  ER work up recently have been unremarkable per patient.  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  BP on rooming in today is normal.  Patient has seen BP to 180's systolic at home.  Has measured BP left and right arm/wrist - no discrepancy.    Patient states he has started keto diet and intermittent fasting to manage weight and try to be healthier.    Today's PHQ-2 Score:   PHQ-2 ( 1999 [employer]) 6/8/2022   Q1: Little interest or pleasure in doing things 1   Q2: Feeling down, depressed or hopeless 2   PHQ-2 Score 3   Q1: Little interest or pleasure in doing things Several days   Q2: Feeling down, depressed or hopeless More than half the days   PHQ-2 Score 3       Abuse: Current or Past(Physical, Sexual or Emotional)- No  Do you feel safe in your environment? Yes    Have you ever done Advance Care Planning? (For example, a Health Directive, POLST, or a discussion with a medical provider or your loved ones about your wishes): Yes, patient states has an Advance Care Planning document and will bring a copy to the clinic.    Social History     Tobacco Use     Smoking status: Former Smoker     Years: 7.00     Types: Cigarettes     Smokeless tobacco: Never  Used   Substance Use Topics     Alcohol use: Yes     Comment: 5-6 drinks daily     If you drink alcohol do you typically have >3 drinks per day or >7 drinks per week? Yes        AUDIT - Alcohol Use Disorders Identification Test - Reproduced from the World Health Organization Audit 2001 (Second Edition) 6/8/2022   1.  How often do you have a drink containing alcohol? 4 or more times a week   2.  How many drinks containing alcohol do you have on a typical day when you are drinking? 3 or 4   3.  How often do you have five or more drinks on one occasion? Weekly   4.  How often during the last year have you found that you were not able to stop drinking once you had started? Never   5.  How often during the last year have you failed to do what was normally expected of you because of drinking? Never   6.  How often during the last year have you needed a first drink in the morning to get yourself going after a heavy drinking session? Never   7.  How often during the last year have you had a feeling of guilt or remorse after drinking? Never   8.  How often during the last year have you been unable to remember what happened the night before because of your drinking? Never   9.  Have you or someone else been injured because of your drinking? No   10. Has a relative, friend, doctor or other health care worker been concerned about your drinking or suggested you cut down? No   TOTAL SCORE 8       Last PSA: No results found for: PSA    Reviewed orders with patient. Reviewed health maintenance and updated orders accordingly - Yes  Patient Active Problem List   Diagnosis     S/P laparoscopic appendectomy     Past Surgical History:   Procedure Laterality Date     LAPAROSCOPIC APPENDECTOMY  6/25/2013    Procedure: LAPAROSCOPIC APPENDECTOMY;  herniorrphory;  Surgeon: Philly Lynn MD;  Location: [facility]       Social History     Tobacco Use     Smoking status: Former Smoker     Years: 7.00     Types: Cigarettes     Smokeless  tobacco: Never Used   Substance Use Topics     Alcohol use: Yes     Comment: 5-6 drinks daily     Family History   Problem Relation Age of Onset     Diabetes Father      Diabetes Maternal Grandfather      Diabetes Paternal Grandfather          Current Outpatient Medications   Medication Sig Dispense Refill     busPIRone (BUSPAR) 15 MG tablet Take 15 mg by mouth 2 times daily       clonazePAM (KLONOPIN) 0.5 MG tablet TAKE 1 TABLET BY MOUTH TWO TIMES DAILY AS NEEDED FOR EXTREME ANXIETY OR PANIC       magnesium oxide (MAG-OX) 400 MG tablet Take 400 mg by mouth daily       vitamin B complex with vitamin C (VITAMIN  B COMPLEX) tablet Take 1 tablet by mouth daily       vitamin B complex with vitamin C (VITAMIN  B COMPLEX) tablet Take 1 tablet by mouth daily       No Known Allergies    Reviewed and updated as needed this visit by clinical staff   Tobacco  Allergies  Meds   Med Hx  Surg Hx  Fam Hx  Soc Hx        Reviewed and updated as needed this visit by Provider                   Past Medical History:   Diagnosis Date     Acute appendicitis with peritoneal abscess 7/9/2013      Past Surgical History:   Procedure Laterality Date     LAPAROSCOPIC APPENDECTOMY  6/25/2013    Procedure: LAPAROSCOPIC APPENDECTOMY;  herniorrphory;  Surgeon: Philly Lynn MD;  Location: [facility]       Review of Systems   Constitutional: Negative for chills and fever.   HENT: Negative for congestion, ear pain, hearing loss and sore throat.    Eyes: Negative for pain and visual disturbance.   Respiratory: Positive for shortness of breath. Negative for cough.    Cardiovascular: Positive for palpitations. Negative for chest pain and peripheral edema.   Gastrointestinal: Negative for abdominal pain, constipation, diarrhea, heartburn, hematochezia and nausea.   Genitourinary: Negative for dysuria, frequency, genital sores, hematuria, impotence, penile discharge and urgency.   Musculoskeletal: Negative for arthralgias, joint swelling  "and myalgias.   Skin: Negative for rash.   Neurological: Negative for dizziness, weakness, headaches and paresthesias.   Psychiatric/Behavioral: Positive for mood changes. The patient is nervous/anxious.        OBJECTIVE:   /88 (BP Location: Left arm, Patient Position: Sitting, Cuff Size: Adult Regular)   Pulse 86   Temp 97.5  F (36.4  C) (Tympanic)   Resp 16   Ht 1.88 m (6' 2\")   Wt 122.8 kg (270 lb 12.8 oz)   SpO2 98%   BMI 34.77 kg/m      Physical Exam  GENERAL APPEARANCE: obese, ambulatory w/o assist, alert and no distress  EYES: pink conj, no icterus, PERRL, EOMI  HENT: ear canals and TM's normal, nose and mouth without ulcers or lesions, oropharynx clear and oral mucous membranes moist  NECK: no adenopathy, no asymmetry, masses, or scars and thyroid normal to palpation  RESP: lungs clear to auscultation - no rales, rhonchi or wheezes  CV: regular rates and rhythm, normal S1 S2, no S3 or S4, no murmur, click or rub, no peripheral edema and peripheral pulses strong  ABDOMEN: soft, nontender, no hepatosplenomegaly, no masses and bowel sounds normal  RECTAL: deferred  MS: no musculoskeletal defects are noted and gait is age appropriate without ataxia  SKIN: no suspicious lesions or rashes  NEURO: Normal strength and tone, sensory exam grossly normal, mentation intact and speech normal    Diagnostic Test Results:  none     ASSESSMENT/PLAN:   Jun was seen today for physical.    Diagnoses and all orders for this visit:    Routine general medical examination at a health care facility  Patient was advised on recommended screening and preventive health recommendations.  He verbalized understanding and agreed to the plans below.    Elevated blood pressure reading without diagnosis of hypertension  -     OFFICE/OUTPT VISIT,EST,LEVL IV  Normal BP on exam. However, patient reports higher BP outside of clinic intermittently that he attributes to increased anxiety.  Offered 24-hr BP monitoring. Patient deferred " "and would like to optimize anxiety management for now.  Reinforced sodium restriction and weight management.  Consider medical treatment if with documented persistent elevation.  Return precautions discussed and given to patient.     JULIENNE (generalized anxiety disorder)  -     busPIRone (BUSPAR) 15 MG tablet; 2 tablets orally in the morning, one tablet at night  -     OFFICE/OUTPT VISIT,EST,LEVL IV  Reviewed again with patient his hx of this and PTSD. Patient said he has gone through genetic testing for class of med that is most compatible for him and buspirone was the result.  Discussed dose adjustment of buspirone 15 mg to 2 tablets in the AM and 1 tablet in PM> patient concurred.  Patient to see mental health provider in a few weeks.  juan josé cifuentes with an Evisit in 2 weeks or so.  Return precautions discussed and given to patient.     Need for hepatitis C screening test  -     Hepatitis C Screen Reflex to HCV RNA Quant and Genotype; Future  -     Hepatitis C Screen Reflex to HCV RNA Quant and Genotype    Screening for human immunodeficiency virus  -     HIV Antigen Antibody Combo; Future  -     HIV Antigen Antibody Combo    Lipid screening  -     Lipid panel reflex to direct LDL Fasting; Future  -     Lipid panel reflex to direct LDL Fasting    Screening for malignant neoplasm of colon  -     CRYS([employer])        Patient has been advised of split billing requirements and indicates understanding: Yes    COUNSELING:   Reviewed preventive health counseling, as reflected in patient instructions    Estimated body mass index is 34.77 kg/m  as calculated from the following:    Height as of this encounter: 1.88 m (6' 2\").    Weight as of this encounter: 122.8 kg (270 lb 12.8 oz).     Weight management plan: Discussed healthy diet and exercise guidelines    He reports that he has quit smoking. His smoking use included cigarettes. He quit after 7.00 years of use. He has never used smokeless tobacco.      Counseling " Resources:  ATP IV Guidelines  Pooled Cohorts Equation Calculator  FRAX Risk Assessment  ICSI Preventive Guidelines  Dietary Guidelines for Americans, 2010  [email or web address] MyPlate  ASA Prophylaxis  Lung CA Screening    Kahlil Denny MD  [facility]  Answers for HPI/ROS submitted by the patient on 6/8/2022  If you checked off any problems, how difficult have these problems made it for you to do your work, take care of things at home, or get along with other people?: Somewhat difficult  PHQ9 TOTAL SCORE: 10

## 2022-06-10 NOTE — RESULT ENCOUNTER NOTE
The 10-year ASCVD risk score (Brittani SIGALA Jr., et al., 2013) is: 3.4%    Values used to calculate the score:      Age: 52 years      Sex: Male      Is Non- : No      Diabetic: No      Tobacco smoker: No      Systolic Blood Pressure: 116 mmHg      Is BP treated: No      HDL Cholesterol: 58 mg/dL      Total Cholesterol: 231 mg/dL

## 2022-06-13 ENCOUNTER — MYC REFILL (OUTPATIENT)
Dept: FAMILY MEDICINE | Facility: CLINIC | Age: 52
End: 2022-06-13
Payer: COMMERCIAL

## 2022-06-13 DIAGNOSIS — F41.1 GAD (GENERALIZED ANXIETY DISORDER): ICD-10-CM

## 2022-06-13 RX ORDER — BUSPIRONE HYDROCHLORIDE 15 MG/1
TABLET ORAL
Qty: 60 TABLET | Refills: 1 | Status: SHIPPED | OUTPATIENT
Start: 2022-06-13 | End: 2022-07-20

## 2022-06-13 NOTE — TELEPHONE ENCOUNTER
Pending Prescriptions:                       Disp   Refills    busPIRone (BUSPAR) 15 MG tablet           60 tab*0            Si tablets orally in the morning, one tablet at           night    Medication is being filled for 1 time refill only due to:  written on 22 but no quantity.

## 2022-07-11 ENCOUNTER — VIRTUAL VISIT (OUTPATIENT)
Dept: BEHAVIORAL HEALTH | Facility: CLINIC | Age: 52
End: 2022-07-11
Payer: COMMERCIAL

## 2022-07-11 VITALS — HEIGHT: 74 IN | BODY MASS INDEX: 30.16 KG/M2 | WEIGHT: 235 LBS

## 2022-07-11 DIAGNOSIS — F41.9 ANXIETY DISORDER, UNSPECIFIED TYPE: Primary | ICD-10-CM

## 2022-07-11 PROCEDURE — 90791 PSYCH DIAGNOSTIC EVALUATION: CPT | Mod: 52 | Performed by: SOCIAL WORKER

## 2022-07-11 ASSESSMENT — PATIENT HEALTH QUESTIONNAIRE - PHQ9: SUM OF ALL RESPONSES TO PHQ QUESTIONS 1-9: 11

## 2022-07-11 NOTE — PROGRESS NOTES
"Collaborative Care Psychiatry Service  Provider Name: Lenora Etienne, [facility], [facility]    PATIENT'S NAME: Jun Glez  PREFERRED NAME: Jun  PREFERRED PRONOUNS:    MRN:   [id number]  :   1970   ACCT. NUMBER: [id number]  DATE OF SERVICE: 22  START TIME: 8:00a  END TIME: 8:28a    BRIEF ADULT DIAGNOSTIC ASSESSMENT    Telemedicine Visit: The patient's condition can be safely assessed and treated via synchronous audio and visual telemedicine encounter.      Reason for Telemedicine Visit: Services only offered telehealth    Originating Site (Patient Location): Patient's place of employment    Distant Site (Provider Location): Provider Remote Setting- Home Office    Consent:  The patient/guardian has verbally consented to: the potential risks and benefits of telemedicine (video visit) versus in person care; bill my insurance or make self-payment for services provided; and responsibility for payment of non-covered services.     Mode of Communication:  Video Conference via Cloud[employer]    As the provider I attest to compliance with applicable laws and regulations related to telemedicine.    Identifying Information:  Patient is a 52 year old, .  The pronoun use throughout this assessment reflects the patient's chosen pronoun.  Patient was referred for an assessment by primary care provider.  Patient attended the session alone.     Chief Complaint:   The reason for seeking services at this time is: \" increased anxiety \"   The problem(s) began over the past few years has noticed an increase. Patient has attempted to resolve these concerns in the past through medication management.  Pt shares that that about 1.5 years ago he went to ED for chest pain as he felt like something was wrong.  After a work up it was thought to be anxiety.  Has been going to [facility] and was dx'd with JULIENNE and PTSD.  Has tried a few different medications an has not had any luck this far with managing his symptoms.  " "Feels that he is likely drug resistant as he doesn't seem to have any response to medications and did do the Gene Sight testing.  Is currently on buspar and klonopin PRN.  Has a heavy feeling that \"sense of impending doom\".  Worries a lot, has chest pain/tightness, throat discomfort, increased BP, etc.   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  Shares that home life is fantastic, doesn't worry about finances.  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  Currently going through the community paramedic program but for the time being he has to be in the truck.    Shares that he is pretty good at \"masking\" his anxiety/panic but that he is worried about the effect this will have on him over time.  Does admit that for the past several years he has been doing some self-medicating and drinks about 5-7 mixed drinks a night.  Sleep is difficult and has started taking magnesium and B complex which he feels is helping a little bit.  Denies any SI.    Does the client have any condition that is currently presenting as a potential to harm themselves or others (severe withdrawal, serious medical condition, severe emotional/behavioral problem)? No.  Proceed with assessment.    Review of Symptoms per patient report:  Depression: Change in sleep, Lack of interest and Change in energy level  Doris:  No Symptoms  Psychosis: No Symptoms  Anxiety: Excessive worry and Nervousness  Panic:  Palpitations, Shortness of breath and Sense of impending doom  Post Traumatic Stress Disorder:  Experienced traumatic event works as a paramedic and was a , cummulative PTSD   Eating Disorder: No Symptoms  ADD / ADHD:  Distractibility  Conduct Disorder: No symptoms  Autism Spectrum Disorder: No symptoms  Obsessive Compulsive Disorder: No " Symptoms    Sleep: Has a hard time falling asleep.  Has 5-7 drinks most nights.  Takes magnesium and B complex.  For the most part gets 5-6 hours of sleep    Caffeine:  Backed way off.  1 energy drink most days but not all days.   Tobacco: past use    Current alcohol use: drinking nightly about 5-7 drinks  Current drug use: denies    Rating Scales:  PHQ-9:   [facility] Follow-up to PHQ 5/19/2022 6/8/2022 7/11/2022   PHQ-9 9. Suicide Ideation past 2 weeks Not at all Not at all Not at all      GAD7:    JULIENNE-7 SCORE 7/4/2022   Total Score 21 (severe anxiety)   Total Score 21     CGI:  First:  No data recorded  Most recent:  No data recorded    WHODAS:   WHODAS 2.0 Total Score 7/4/2022 7/4/2022   Total Score 24 24   Total Score MyChart - 24        CAGE:    CAGE-AID Flowsheet 7/4/2022 7/4/2022   Have you ever felt you should Cut down on your drinking or drug use? 1 1   Have people Annoyed you by criticizing your drinking or drug use? 0 0   Have you ever felt bad or Guilty about your drinking or drug use? 0 0   Have you ever had a drink or used drugs first thing in the morning to steady your nerves or to get rid of a hangover? (Eye opener) 0 0   CAGE-AID SCORE 1 1   1. Have you felt you ought to cut down on your drinking or drug use? - Yes   2. Have people annoyed you by criticizing your drinking or drug use? - No   3. Have you felt bad or guilty about your drinking or drug use? - No   4. Have you ever had a drink or used drugs first thing in the morning to steady your nerves or to get rid of a hangover (eye opener)? - No   CAGE-AID SCORE - 1 (A total score of 2 or greater is considered clinically significant)         Personal Medical History:  Past Medical History:   Diagnosis Date     Acute appendicitis with peritoneal abscess 7/9/2013       Patient has received mental health services in the past: med management at [facility].  some therapy as well but didn't have time for it..  Psychiatric Hospitalizations: None.  Patient denies  "a history of civil commitment. Currently, patient is not receiving other mental health services.  These include none.     Patient does report a history of head injury / trauma / cognitive impairment / seizures.  Concussion in 4th grade (doesn't know details)      Current Medications:  Current Outpatient Medications   Medication Sig Dispense Refill     busPIRone (BUSPAR) 15 MG tablet 2 tablets orally in the morning, one tablet at night 60 tablet 1     clonazePAM (KLONOPIN) 0.5 MG tablet TAKE 1 TABLET BY MOUTH TWO TIMES DAILY AS NEEDED FOR EXTREME ANXIETY OR PANIC       magnesium oxide (MAG-OX) 400 MG tablet Take 400 mg by mouth daily       vitamin B complex with vitamin C (STRESS TAB) tablet Take 1 tablet by mouth daily       vitamin B complex with vitamin C (STRESS TAB) tablet Take 1 tablet by mouth daily          Allergies:  No Known Allergies    Family Psychiatric History:  Patient did not report a family history of mental health concerns.   Not diagnosed but believes there is anxiety.    Family History     Problem (# of Occurrences) Relation (Name,Age of Onset)    Diabetes (3) Father, Maternal Grandfather, Paternal Grandfather          Social/Family History:  Patient reported they grew up in [place], [place].  They were raised by biological parents. No siblings.  Left home when parents  when he was 16.  Patient reported that   childhood was \"Challenging\".  Patient denies experiencing childhood abuse/neglect. Patient described their current relationships with family of origin as ok.  Father  a couple of years ago.      The patient has been  1 times and has 1 children.  He described the relationship with   spouse as, \"good.\" Patient reported having few good friends.     Cultural influences and impact on patient's life structure, values, norms, and healthcare: pt denies. Patient identified their preferred language to be English. Patient reported they does not need the assistance of an  " or other support involved in treatment.       Educational/Occupational History:  Patient reported   highest education level was some college. The patient did not serve in the .  Patient is currently employed full time and reports they are able to function appropriately at work.. Works full time as a paramedic.  Work does have triggers.  Good at hiding his anxiety at work.      Social History     Socioeconomic History     Marital status:      Spouse name: Not on file     Number of children: Not on file     Years of education: Not on file     Highest education level: Not on file   Occupational History     Not on file   Tobacco Use     Smoking status: Former Smoker     Years: 7.00     Types: Cigarettes     Smokeless tobacco: Never Used   Vaping Use     Vaping Use: Never used   Substance and Sexual Activity     Alcohol use: Yes     Comment: 5-6 drinks daily     Drug use: No     Sexual activity: Yes   Other Topics Concern     Parent/sibling w/ CABG, MI or angioplasty before 65F 55M? Not Asked   Social History Narrative     Not on file     Social Determinants of Health     Financial Resource Strain: Not on file   Food Insecurity: Not on file   Transportation Needs: Not on file   Physical Activity: Not on file   Stress: Not on file   Social Connections: Not on file   Intimate Partner Violence: Not on file   Housing Stability: Not on file       Patient reported that they have not been involved with the legal system.   Patient denies being on probation / parole / under the jurisdiction of the court.    Current Mental Status Exam:   Appearance:   Appropriate    Eye Contact:   Good   Psychomotor:   Normal   Attitude / Demeanor:  Cooperative   Speech      Rate / Production:  Normal/ Responsive      Volume:   Normal  volume      Language:   intact  Mood:    Normal  Affect:    Appropriate    Thought Content:  Clear   Thought Process:  Coherent       Associations:  No loosening of associations  Insight:    Good    Judgment:   Intact   Orientation:   All  Attention/concentration: Good      Safety Assessment:   Current Safety Concerns:  [place] Suicide Severity Rating Scale (Lifetime/Recent)  [place] Suicide Severity Rating (Lifetime/Recent) 7/13/2022   1. Wish to be Dead (Lifetime) 0   2. Non-Specific Active Suicidal Thoughts (Lifetime) 0   Actual Attempt (Lifetime) 0   Has subject engaged in non-suicidal self-injurious behavior? (Lifetime) 0   Interrupted Attempts (Lifetime) 0   Aborted or Self-Interrupted Attempt (Lifetime) 0   Preparatory Acts or Behavior (Lifetime) 0   Calculated C-SSRS Risk Score (Lifetime/Recent) No Risk Indicated     Patient denies current homicidal ideation and behaviors.  Patient denies current self-injurious ideation and behaviors.    Patient denied risk behaviors associated with substance use.  Patient denies any high risk behaviors associated with mental health symptoms.  Patient reports the following current concerns for their personal safety: None.  Patient reports there are firearms in the house. The firearms are secured in a locked space.     History of Safety Concerns:  Patient denied a history of homicidal ideation.     Patient denied a history of personal safety concerns.    Patient denied a history of assaultive behaviors.    Patient denied a history of sexual assault behaviors.     Patient denied a history of risk behaviors associated with substance use.  Patient denies any history of high risk behaviors associated with mental health symptoms.  Patient reports the following protective factors: positive relationships positive social network and positive family connections, forward/future oriented thinking, dedication to family/friends, adherence with prescribed medication, living with other people and daily obligations    Risk Plan:  See Preliminary Treatment Plan for Safety and Risk Management Plan    Diagnosis:  Diagnostic Criteria:   Unspecified Anxiety Disorder , Symptoms  characteristic of an anxiety disorder that caused clinically significant distress or impairment in social, occupational, or other important areas of functioning predominate but do not meet the full criteria for any of the disorders of the anxiety disorders diagnostic class.    Diagnoses:  1. Anxiety disorder, unspecified type        Patient's Strengths and Limitations:  Patient identified the following strengths or resources that will help them succeed in treatment: friends / good social support, family support and work ethic. Things that may interfere with the patient's success in treatment include: none identified.     Recommendations:     1. Plan for Safety and Risk Management:Recommended that patient call 911 or go to the local ED should there be a change in any of these risk factors..  Report to child / adult protection services was n/a.      2. Resources/Service Plan:       services are not indicated.     Modifications to assist communication are not indicated.     Additional disability accommodations are not indicated.      3. Collaboration:  Collaboration / coordination of treatment will be initiated with the following support professionals: psychiatry.      4.  Referrals:   The following referral(s) will be initiated: none at this time..       Staff Name/Credentials:  GIOVANY Solis, [facility], [facility], [place]    July 11, 2022

## 2022-07-13 ASSESSMENT — COLUMBIA-SUICIDE SEVERITY RATING SCALE - C-SSRS
TOTAL  NUMBER OF ABORTED OR SELF INTERRUPTED ATTEMPTS LIFETIME: NO
ATTEMPT LIFETIME: NO
2. HAVE YOU ACTUALLY HAD ANY THOUGHTS OF KILLING YOURSELF?: NO
6. HAVE YOU EVER DONE ANYTHING, STARTED TO DO ANYTHING, OR PREPARED TO DO ANYTHING TO END YOUR LIFE?: NO
1. HAVE YOU WISHED YOU WERE DEAD OR WISHED YOU COULD GO TO SLEEP AND NOT WAKE UP?: NO
TOTAL  NUMBER OF INTERRUPTED ATTEMPTS LIFETIME: NO

## 2022-07-14 LAB — NONINV COLON CA DNA+OCC BLD SCRN STL QL: NEGATIVE

## 2022-08-02 ENCOUNTER — VIRTUAL VISIT (OUTPATIENT)
Dept: PSYCHOLOGY | Facility: CLINIC | Age: 52
End: 2022-08-02
Payer: COMMERCIAL

## 2022-08-02 ENCOUNTER — VIRTUAL VISIT (OUTPATIENT)
Dept: PSYCHIATRY | Facility: CLINIC | Age: 52
End: 2022-08-02
Payer: COMMERCIAL

## 2022-08-02 DIAGNOSIS — F41.1 GAD (GENERALIZED ANXIETY DISORDER): ICD-10-CM

## 2022-08-02 DIAGNOSIS — F43.10 PTSD (POST-TRAUMATIC STRESS DISORDER): ICD-10-CM

## 2022-08-02 DIAGNOSIS — F10.20 UNCOMPLICATED ALCOHOL DEPENDENCE (H): Primary | ICD-10-CM

## 2022-08-02 PROCEDURE — 90791 PSYCH DIAGNOSTIC EVALUATION: CPT | Mod: 95 | Performed by: PSYCHOLOGIST

## 2022-08-02 PROCEDURE — 99204 OFFICE O/P NEW MOD 45 MIN: CPT | Mod: 95 | Performed by: PSYCHIATRY & NEUROLOGY

## 2022-08-02 RX ORDER — GABAPENTIN 300 MG/1
300-600 CAPSULE ORAL 2 TIMES DAILY
Qty: 120 CAPSULE | Refills: 1 | Status: SHIPPED | OUTPATIENT
Start: 2022-08-02 | End: 2022-09-13

## 2022-08-02 RX ORDER — CLONIDINE HYDROCHLORIDE 0.1 MG/1
TABLET ORAL
Qty: 90 TABLET | Refills: 1 | Status: SHIPPED | OUTPATIENT
Start: 2022-08-02 | End: 2022-09-13

## 2022-08-02 NOTE — Clinical Note
Please call this patient to get them scheduled for a follow-up visit in 3-4 weeks. Please schedule with me and the [facility]. Thanks!

## 2022-08-02 NOTE — PROGRESS NOTES
"    [facility] Psychiatry Services - [place]  Provider Name:  Alejo Cervantes     Credentials:  RODRIGO Siegel    PATIENT'S NAME: Jun Glez  PREFERRED NAME: Jun  PRONOUNS: he/him/his     MRN: [id number]  : 1970  ADDRESS: [address] [address]  ACCT. NUMBER:  [id number]  DATE OF SERVICE: 22  START TIME: 01:00pm  END TIME: 01:40pm  PREFERRED PHONE: [phone]  May we leave a program related message: Yes  SERVICE MODALITY:  Video Visit:      Provider verified identity through the following two step process.  Patient provided:  Patient     Telemedicine Visit: The patient's condition can be safely assessed and treated via synchronous audio and visual telemedicine encounter.      Reason for Telemedicine Visit: Services only offered telehealth    Originating Site (Patient Location): Patient's home    Distant Site (Provider Location): Provider Remote Setting- Home Office    Consent:  The patient/guardian has verbally consented to: the potential risks and benefits of telemedicine (video visit) versus in person care; bill my insurance or make self-payment for services provided; and responsibility for payment of non-covered services.     Patient would like the video invitation sent by:  My Chart    Mode of Communication:  Video Conference via mcTEL    As the provider I attest to compliance with applicable laws and regulations related to telemedicine.    UNIVERSAL ADULT Mental Health DIAGNOSTIC ASSESSMENT    Identifying Information:  Patient is a 52 year old,    individual.    Patient was referred for an assessment by  self.  Patient attended the session alone.    Chief Complaint:   The reason for seeking services at this time is: \"PTSD\".  The problem(s) began 2020.  First appointment with patient in [facility] and was advised of the short-term, team based structure of the model including role of [facility] and provider. Patient indicated understanding of the model " "and agreed to proceed with services as described.    Reported that a few years ago he was having chest pain and thought he was having blood pressure problems. 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 Spoke with his colleague and was told it was panic attacks. He saw someone at [facility] but prefers to stay with [place]. [facility] tried a few medications and was not going well. Did GeneSight testing and had no help. Most recently was on Buspar and had side-effects and stopped it abruptly. He has tried 3 medications (escitalopram, venlaflaxine, buspar, and clonazepam) but they have not helped so much. He has started using magnesium and and vitamin B complex which has improved his sleep but still has nightmares. He has been diagnosed with JULIENNE and eventually PTSD. \"Right now I'm just dealing with it.\" He exercises 3-4x a week which helps with stress to some extent. He is having panic symptoms for 7-10 hours at a time. He does not feel like he is having panic attacks but is certainly having high anxiety and concerned with how his blood pressure increases. Had been working with a therapist at [facility] who suggested EMDR but he watched something on YouTube about it but it did not seem like something that would be helpful for him. Spoke about having anxiety about anticipating when his pager will go off and \"what will we see next?\" This is has been starting to happen more even on his days off which is new. He has decreased his hobbies the last few months which is also unusual for him.    Patient has attempted to resolve these concerns in the past through medications and psychotherapy.    Social/Family History:  Patient reported they grew up in [place]  .  They were raised by biological parents  .  Parents  /  when he was 17yo. Only child.  Patient reported that their childhood was \"it was good. Nothing to complain about.\" Patient described their current " "relationships with family of origin as father  3 years ago, and mother is living but they are not very close.     The patient describes their cultural background as .  Cultural influences and impact on patient's life structure, values, norms, and healthcare: None.  Contextual influences on patient's health include: Individual Factors  for many years; multiple trauma exposures.    These factors will be addressed in the Preliminary Treatment plan. Patient identified their preferred language to be English. Patient reported they does not need the assistance of an  or other support involved in therapy.     Patient reported had no significant delays in developmental tasks.   Patient's highest education level was some college  .  Patient identified the following learning problems: none reported.  Modifications will not be used to assist communication in therapy. Patient reports they are  able to understand written materials.    Patient reported the following relationship history;  and divroced.  Patient's current relationship status is has a partner or significant other for 12 years.   Patient identified their sexual orientation as heterosexual.  Patient reported having 1 child(samia). Patient identified friends as part of their support system.  Patient identified the quality of these relationships as fair,  .  Good relationship with partner, \"we get along great. We have a lot of fun.\" Has several good friends. 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    Patient's current living/housing situation involves staying in own home/apartment.  The immediate members of family and household include Caitlin Jackson, 49,Domestic partner and they report that housing is stable.    Patient is currently employed fulltime. Paramedic for 15 years; was a  prior to that. Patient reports their finances are obtained through employment. Patient does not identify " finances as a current stressor.      Patient reported that they have not been involved with the legal system. Patient does not report being under probation/ parole/ jurisdiction. They are not under any current court jurisdiction. .    Patient's Strengths and Limitations:  Patient identified the following strengths or resources that will help them succeed in treatment: commitment to health and well being, exercise routine, friends / good social support, family support, insight, intelligence, positive work environment, motivation, sense of humor, strong social skills and work ethic. Things that may interfere with the patient's success in treatment include: none identified.     Assessments:  PHQ2:   PHQ-2 ( 1999 [employer]) 6/8/2022 5/19/2022   Q1: Little interest or pleasure in doing things 1 1   Q2: Feeling down, depressed or hopeless 2 2   PHQ-2 Score 3 3   Q1: Little interest or pleasure in doing things Several days Several days   Q2: Feeling down, depressed or hopeless More than half the days More than half the days   PHQ-2 Score 3 3     PHQ9:   PHQ-9 SCORE 5/19/2022 6/8/2022 7/11/2022   PHQ-9 Total Score MyChart 14 (Moderate depression) 10 (Moderate depression) -   PHQ-9 Total Score 14 10 11     GAD2:   JULIENNE-2 7/4/2022   Feeling nervous, anxious, or on edge 3   Not being able to stop or control worrying 3   JULIENNE-2 Total Score 6     GAD7:   JULIENNE-7 SCORE 7/4/2022   Total Score 21 (severe anxiety)   Total Score 21     CAGE-AID:   CAGE-AID Total Score 7/4/2022 7/4/2022   Total Score 1 1   Total Score MyChart - 1 (A total score of 2 or greater is considered clinically significant)     PROMIS 10-Global Health (all questions and answers displayed):   PROMIS 10 7/4/2022   In general, would you say your health is: Very good   In general, would you say your quality of life is: Good   In general, how would you rate your physical health? Fair   In general, how would you rate your mental health, including your mood and your ability  to think? Poor   In general, how would you rate your satisfaction with your social activities and relationships? Fair   In general, please rate how well you carry out your usual social activities and roles Fair   To what extent are you able to carry out your everyday physical activities such as walking, climbing stairs, carrying groceries, or moving a chair? Mostly   How often have you been bothered by emotional problems such as feeling anxious, depressed or irritable? Often   How would you rate your fatigue on average? Moderate   How would you rate your pain on average?   0 = No Pain  to  10 = Worst Imaginable Pain 0   In general, would you say your health is: 4   In general, would you say your quality of life is: 3   In general, how would you rate your physical health? 2   In general, how would you rate your mental health, including your mood and your ability to think? 1   In general, how would you rate your satisfaction with your social activities and relationships? 2   In general, please rate how well you carry out your usual social activities and roles. (This includes activities at home, at work and in your community, and responsibilities as a parent, child, spouse, employee, friend, etc.) 2   To what extent are you able to carry out your everyday physical activities such as walking, climbing stairs, carrying groceries, or moving a chair? 4   In the past 7 days, how often have you been bothered by emotional problems such as feeling anxious, depressed, or irritable? 4   In the past 7 days, how would you rate your fatigue on average? 3   In the past 7 days, how would you rate your pain on average, where 0 means no pain, and 10 means worst imaginable pain? 0   Global Mental Health Score 8   Global Physical Health Score 14   PROMIS TOTAL - SUBSCORES 22   Some recent data might be hidden     PROMIS 10-Global Health (only subscores and total score):   PROMIS-10 Scores Only 7/4/2022   Global Mental Health Score 8    Global Physical Health Score 14   PROMIS TOTAL - SUBSCORES 22     [place] Suicide Severity Rating Scale (Lifetime/Recent)  [place] Suicide Severity Rating (Lifetime/Recent) 7/13/2022   1. Wish to be Dead (Lifetime) 0   2. Non-Specific Active Suicidal Thoughts (Lifetime) 0   Actual Attempt (Lifetime) 0   Has subject engaged in non-suicidal self-injurious behavior? (Lifetime) 0   Interrupted Attempts (Lifetime) 0   Aborted or Self-Interrupted Attempt (Lifetime) 0   Preparatory Acts or Behavior (Lifetime) 0   Calculated C-SSRS Risk Score (Lifetime/Recent) No Risk Indicated       Personal and Family Medical History:  Patient does not report a family history of mental health concerns.  Patient reports family history includes Diabetes in his father, maternal grandfather, and paternal grandfather..     Patient does report Mental Health Diagnosis and/or Treatment.  Patient Patient reported the following previous diagnoses which include(s):   .  Patient reported symptoms began primarily 2-3 years.  Patient has received mental health services in the past:     .  Psychiatric Hospitalizations:   when   ,  ,  ,  ,  ,  ,  ,  ,  ,  ,  .  Patient denies a history of civil commitment.  Currently, patient    receiving other mental health services.  These include none.         Patient has had a physical exam to rule out medical causes for current symptoms.  Date of last physical exam was within the past year. Client was encouraged to follow up with PCP if symptoms were to develop. The patient has a [place] Primary Care Provider, who is named No Ref-Primary, Physician..  Patient reports no current medical concerns.  Patient denies any issues with pain..   There are not significant appetite / nutritional concerns / weight changes.   Patient does report a history of head injury / trauma / cognitive impairment.  Concussion in 4th degree.    Patient reports current meds as:   Outpatient Medications Marked as Taking for the 8/2/22  "encounter (Virtual Visit) with Jaun Cervantes PsyD   Medication Sig     clonazePAM (KLONOPIN) 0.5 MG tablet TAKE 1 TABLET BY MOUTH TWO TIMES DAILY AS NEEDED FOR EXTREME ANXIETY OR PANIC       Medication Adherence:  Patient reports taking.  taking prescribed medications as prescribed.    Patient Allergies:  No Known Allergies    Medical History:    Past Medical History:   Diagnosis Date     Acute appendicitis with peritoneal abscess 7/9/2013         Current Mental Status Exam:   Appearance:  Appropriate    Eye Contact:  Good   Psychomotor:  Normal       Gait / station:  no problem  Attitude / Demeanor: Cooperative   Speech      Rate / Production: Normal/ Responsive      Volume:  Normal  volume      Language:  no problems  Mood:   \"I stay pretty upbeat.\"  Affect:   Appropriate    Thought Content: Clear   Thought Process: Goal Directed  Logical       Associations: No loosening of associations  Insight:   Good   Judgment:  Intact   Orientation:  All  Attention/concentration: Good      Substance Use:  Patient did not report a family history of substance use concerns; see medical history section for details.  Patient has not received chemical dependency treatment in the past.  Patient has not ever been to detox.      Patient is not currently receiving any chemical dependency treatment.           Substance History of use Age of first use Date of last use     Pattern and duration of use (include amounts and frequency)   Alcohol currently use   20 7/3/2022 REPORTS SUBSTANCE USE: reports using substance   times per day and has 6 mixed drinks 1 shot of vodka in each; \"Just so I can sleep\" at a time.   Patient reports heaviest use is current use. This has been his pattern for 10 years or more.   Cannabis   never used     REPORTS SUBSTANCE USE: N/A     Amphetamines   never used     REPORTS SUBSTANCE USE: N/A   Cocaine/crack    never used       REPORTS SUBSTANCE USE: N/A   Hallucinogens never used         REPORTS SUBSTANCE " USE: N/A   Inhalants never used         REPORTS SUBSTANCE USE: N/A   Heroin never used         REPORTS SUBSTANCE USE: N/A   Other Opiates never used     REPORTS SUBSTANCE USE: N/A   Benzodiazepine   used in the past 52 4/20/2022 REPORTS SUBSTANCE USE: N/A   Barbiturates never used     REPORTS SUBSTANCE USE: N/A   Over the counter meds never used     REPORTS SUBSTANCE USE: N/A   Caffeine used in the past 20   REPORTS SUBSTANCE USE: N/A Rarely now   Nicotine  never used     REPORTS SUBSTANCE USE: N/A   Other substances not listed above:  Identify:  never used     REPORTS SUBSTANCE USE: N/A     Patient reported the following problems as a result of their substance use: no problems, not applicable.    Substance Use: No symptoms    Based on the positive CAGE score and clinical interview there  are indications of drug or alcohol abuse. recommended to reduce frequency and quantity of alcohol use.      Significant Losses / Trauma / Abuse / Neglect Issues:   Patient did not serve in the .  There are indications or report of significant loss, trauma, abuse or neglect issues related to: are no indications and client denies any losses, trauma, abuse, or neglect concerns.  Concerns for possible neglect are not present.     Safety Assessment:   Patient denies current homicidal ideation and behaviors.  Patient denies current self-injurious ideation and behaviors.    Patient denied risk behaviors associated with substance use.  Patient denies any high risk behaviors associated with mental health symptoms.  Patient reports the following current concerns for their personal safety: None.  Patient reports there are firearms in the house.     yes, they are secured.  .    History of Safety Concerns:  Patient denied a history of homicidal ideation.     Patient denied a history of personal safety concerns.    Patient denied a history of assaultive behaviors.    Patient denied a history of sexual assault behaviors.     Patient  denied a history of risk behaviors associated with substance use.  Patient denies any history of high risk behaviors associated with mental health symptoms.  Patient reports the following protective factors: forward or future oriented thinking; dedication to family or friends; safe and stable environment; sense of belonging; purpose; secure attachment; abstinence from substances; living with other people; daily obligations; structured day; effective problem solving skills; sense of meaning; positive social skills; healthy fear of risky behaviors or pain; financial stability; sense of personal control or determination    Risk Plan:  See Recommendations for Safety and Risk Management Plan    Review of Symptoms per patient report:  Depression: No symptoms  Doris:  No Symptoms  Psychosis: No Symptoms  Anxiety: Excessive worry, Nervousness, Physical complaints, such as headaches, stomachaches, muscle tension, Sleep disturbance, Ruminations and Poor concentration  Panic:  Palpitations, Shortness of breath, Tingling and Sense of impending doom  Post Traumatic Stress Disorder:  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 Started drinking 10-12 years ago.  Eating Disorder: No Symptoms  ADD / ADHD:  No symptoms  Conduct Disorder: No symptoms  Autism Spectrum Disorder: No symptoms  Obsessive Compulsive Disorder: No Symptoms    Patient reports the following compulsive behaviors and treatment history:  .      Sleep: averaging 7-8 hours a night; sometimes wake up snoring, not sure if sleep apnea. Usually wake up feeling good. No naps.      Diagnostic Criteria:   Generalized Anxiety Disorder  A. Excessive anxiety and worry about a number of events or activities (such as work or  school performance).   B. The person finds it difficult to control the worry.  C. Select 3 or more symptoms (required for diagnosis). Only one item is required in children.   - Restlessness or feeling keyed up or on edge.    - Being easily fatigued.    - Difficulty concentrating or mind going blank.    - Irritability.    - Muscle tension.    - Sleep disturbance (difficulty falling or staying asleep, or restless unsatisfying sleep).   D. The focus of the anxiety and worry is not confined to features of an Axis I disorder.  E. The anxiety, worry, or physical symptoms cause clinically significant distress or impairment in social, occupational, or other important areas of functioning.   F. The disturbance is not due to the direct physiological effects of a substance (e.g., a drug of abuse, a medication) or a general medical condition (e.g., hyperthyroidism) and does not occur exclusively during a Mood Disorder, a Psychotic Disorder, or a Pervasive Developmental Disorder. Post- Traumatic Stress Disorder  A. The person has been exposed to a traumatic event in which both of the following were present:     (1) the person experienced, witnessed, or was confronted with an event or events that involved actual or threatened death or serious injury, or a threat to the physical integrity of self or others     (2) the person's response involved intense fear, helplessness, or horror. Note: In children, this may be expressed instead by disorganized or agitated behavior  B. The traumatic event is persistently reexperienced in one (or more) of the following ways:     - Recurrent and intrusive distressing recollections of the event, including images, thoughts, or perceptions. Note: In young children, repetitive play may occur in which themes or aspects of the trauma are expressed.      - Recurrent distressing dreams of the event. Note: In children, there may be frightening dreams without recognizable content.      - Physiological  reactivity on exposure to internal or external cues that symbolize or resemble an aspect of the traumatic event.   C. Persistent avoidance of stimuli associated with the trauma and numbing of general responsiveness (not present before the trauma), as indicated by three (or more) of the following:     - Efforts to avoid thoughts, feelings, or conversations associated with the trauma.      - Markedly diminished interest or participation in significant activities.      - Feeling of detachment or estrangement from others.      - Restricted range of affect (e.g., unable to have loving feelings).   D. Persistent symptoms of increased arousal (not present before the trauma), as indicated by two (or more) of the following:     - Difficulty falling or staying asleep.      - Difficulty concentrating.      - Hypervigilance.      - Exaggerated startle response.   E. Duration of the disturbance is more than 1 month.  F. The disturbance causes clinically significant distress or impairment in social, occupational, or other important areas of functioning.      Functional Status:  Patient reports the following functional impairments:  self-care, social interactions and work / vocational responsibilities.     Nonprogrammatic care:  Patient is requesting basic services to address current mental health concerns.    Clinical Summary:  1. Reason for assessment: treatment evaluation  .  2. Psychosocial, Cultural and Contextual Factors: multiple work traumas as , alcohol abuse  .  3. Principal DSM5 Diagnoses  (Sustained by DSM5 Criteria Listed Above):   300.02 (F41.1) Generalized Anxiety Disorder  309.81 (F43.10) Posttraumatic Stress Disorder (includes Posttraumatic Stress Disorder for Children 6 Years and Younger)  Without dissociative symptoms.  4. 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  5. Provisional Diagnosis:   as evidenced  by  .  6. Prognosis: Return to Normal Functioning, Expect Improvement and Relieve Acute Symptoms.  7. Likely consequences of symptoms if not treated: deterioration of functioning.  8. Client strengths include:  caring, educated, empathetic, employed, goal-focused, good listener, insightful, intelligent, motivated, open to learning, open to suggestions / feedback, support of family, friends and providers, supportive, wants to learn, willing to ask questions, willing to relate to others and work history .     Recommendations:     1. Plan for Safety and Risk Management:   Safety and Risk: Recommended that patient call 911 or go to the local ED should there be a change in any of these risk factors..          Report to child / adult protection services was NA.     2. Patient's identified mental health concerns with a cultural influence will be addressed by making reasonable accommodations at the request of the patient.     3. Initial Treatment will focus on:    Anxiety -    Adjustment Difficulties related to: work experiences  Alcohol / Substance Use -  Alcohol.     4. Resources/Service Plan:    services are not indicated.   Modifications to assist communication are not indicated.   Additional disability accommodations are not indicated.      5. Collaboration:   Collaboration / coordination of treatment will be initiated with the following  support professionals: psychiatry.      6.  Referrals:   The following referral(s) will be initiated: pending collaboration with Dr. Elam. Next Scheduled Appointment: TBD.     A Release of Information has been obtained for the following: n/a.     Emergency Contact Joyce was obtained.     7. YUMI:    YUMI:  Discussed the general effects of drugs and alcohol on health and well-being. Provider gave patient printed information about the effects of chemical use on their health and well being. Recommendations:  Reduce frequency and quantity of alcohol.     8. Records:   These  were reviewed at time of assessment.   Information in this assessment was obtained from the medical record and  provided by patient who is a good historian.    Patient will have open access to their mental health medical record.        Provider Name/ Credentials:  Alejo Cervantes PsyD, RODRIGO  August 2, 2022

## 2022-08-02 NOTE — PROGRESS NOTES
"Jun Glez is a 52 year old year old who is being evaluated via a billable video visit.      How would you like to obtain your AVS? MyChart  If you are dropped from the video visit, the video invite should be resent to: Text to cell phone: see Epic  Will anyone else be joining your video visit? No       Telemedicine Visit: The patient's condition can be safely assessed and treated via synchronous audio and visual telemedicine encounter.      Reason for Telemedicine Visit: Covid-19 Pandemic    Originating Site (Patient Location): Patient's home     Distant Site (Provider Location): Provider Remote Setting    Mode of Communication:  Video Conference via [employer]    As the provider I attest to compliance with applicable laws and regulations related to telemedicine.                                                             Outpatient Psychiatric Evaluation- Standard  Adult    Name:  Jun Glez  : 1970    Source of Referral:  Primary Care Provider: Dr. Denny  Current Psychotherapist: None     Identifying Data:  Patient is a 52 year old, partnered / significant other  White Choose not to answer male  who presents for initial visit with me.  Patient is currently employed full time. Patient attended the phone/video session alone. Patient prefers to be called: \"Jun\"    Chief Complaint:  Patient presents with:  Consult    HPI:  Jun Glez is a 52 year old male with past history including PTSD, anxiety who presents today for psychiatric assessment.     Patient with history of being diagnosed with PTSD and anxiety.  Has history of working with [facility] in trying several different medications.  Has not felt like anything has been helpful.  Has also done [employer] testing. Has seen Dr. Denny last couple of months for primary care. These more intense symptoms started about two years ago.  He reports it has been on a \"Slow burn.\" Paying more and more attention to them. More of " "the chest pressure symptoms.  Happening pretty much daily.  No symptoms today yet. Usually when awake. Usually in the afternoon time. 11:30-12p to noon they will start happening. BP will increase, heart rate will increase, increased sweating, feeling of doom. Can't sit still. Drinking has been pretty consistent for the past several years. Switched to vodka within the past few years.  His symptoms will start to resolve once he will go home from his shifts and start to drink alcohol.  He is having 5- 7 mixed drinks a night (of vodka).  Reports no acute safety concerns.  Denies any other drug use.  Does not mix his clonazepam with alcohol.  Has not ever been to chemical dependency treatment.  Has not been to detox.    Psych Meds at Intake:  Buspar - pt reported not taking   Klonopin - rare prn    Past Psych Meds:  escitalopram   venlafaxine  buspar   clonazepam    Per [facility], Dr. Alejo Cervantes, during today's team-based visit:  The reason for seeking services at this time is: \"PTSD\".  The problem(s) began 7/1/2020.  First appointment with patient in [facility] and was advised of the short-term, team based structure of the model including role of C and provider. Patient indicated understanding of the model and agreed to proceed with services as described.     Reported that a few years ago he was having chest pain and thought he was having blood pressure problems. 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 Spoke with his colleague and was told it was panic attacks. He saw someone at [facility] but prefers to stay with [facility]. [facility] tried a few medications and was not going well. Did GeneSight testing and had no help. Most recently was on Buspar and had side-effects and stopped it abruptly. He has tried 3 medications (escitalopram, venlaflaxine, buspar, and clonazepam) but they have not helped so much. He has started using magnesium and and vitamin B complex which has improved his " "sleep but still has nightmares. He has been diagnosed with JULIENNE and eventually PTSD. \"Right now I'm just dealing with it.\" He exercises 3-4x a week which helps with stress to some extent. He is having panic symptoms for 7-10 hours at a time. He does not feel like he is having panic attacks but is certainly having high anxiety and concerned with how his blood pressure increases. Had been working with a therapist at [facility] who suggested EMDR but he watched something on YouTube about it but it did not seem like something that would be helpful for him. Spoke about having anxiety about anticipating when his pager will go off and \"what will we see next?\" This is has been starting to happen more even on his days off which is new. He has decreased his hobbies the last few months which is also unusual for him.     Patient has attempted to resolve these concerns in the past through medications and psychotherapy.    Per Lenora Etienne, MSW, 7/11/2022:  The reason for seeking services at this time is: \" increased anxiety \"   The problem(s) began over the past few years has noticed an increase. Patient has attempted to resolve these concerns in the past through medication management.  Pt shares that that about 1.5 years ago he went to ED for chest pain as he felt like something was wrong.  After a work up it was thought to be anxiety.  Has been going to [facility] and was dx'd with JULIENNE and PTSD.  Has tried a few different medications an has not had any luck this far with managing his symptoms.  Feels that he is likely drug resistant as he doesn't seem to have any response to medications and did do the Gene Sight testing.  Is currently on buspar and klonopin PRN.  Has a heavy feeling that \"sense of impending doom\".  Worries a lot, has chest pain/tightness, throat discomfort, increased BP, etc.   Works as a paramedic for  [employer] and did just get set up with a [facility] PCP.  Shares that home life is fantastic, " "doesn't worry about finances.  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  Currently going through the community paramedic program but for the time being he has to be in the truck.    Shares that he is pretty good at \"masking\" his anxiety/panic but that he is worried about the effect this will have on him over time.  Does admit that for the past several years he has been doing some self-medicating and drinks about 5-7 mixed drinks a night.  Sleep is difficult and has started taking magnesium and B complex which he feels is helping a little bit.  Denies any SI.     Does the client have any condition that is currently presenting as a potential to harm themselves or others (severe withdrawal, serious medical condition, severe emotional/behavioral problem)? No.  Proceed with assessment.    Past diagnoses include: PTSD, anxiety  Current medications include: has a current medication list which includes the following prescription(s): buspirone, clonazepam, magnesium oxide, vitamin b complex with vitamin c, and vitamin b complex with vitamin c.   Medication side effects: Denies  Current stressors include: Symptoms and see HPI above  Coping mechanisms and supports include: Family, Hobbies and Friends    Psychiatric Review of Symptoms Per [facility], Dr. Alejo Cervantes, during today's team-based visit:  Depression:     No symptoms  Doris:             No Symptoms  Psychosis:       No Symptoms  Anxiety:           Excessive worry, Nervousness, Physical complaints, such as headaches, stomachaches, muscle tension, Sleep disturbance, Ruminations and Poor concentration  Panic:              Palpitations, Shortness of breath, Tingling and Sense of impending doom  Post Traumatic Stress Disorder:  Experienced traumatic event multiple traumas as a paramedic, Reexperiencing of trauma, " Avoids traumatic stimuli, Hypervigilance, Increased arousal, Impaired functioning and Nightmares in the past; has had periods of alcohol free during the work week for a year and had nightmares almost nightly. Started drinking 10-12 years ago.  Eating Disorder:          No Symptoms  ADD / ADHD:              No symptoms  Conduct Disorder:       No symptoms  Autism Spectrum Disorder:     No symptoms  Obsessive Compulsive Disorder:       No Symptoms     Patient reports the following compulsive behaviors and treatment history:  .       Sleep: averaging 7-8 hours a night; sometimes wake up snoring, not sure if sleep apnea. Usually wake up feeling good. No naps.    All other ROS negative.     PHQ-9 scores:   PHQ-9 SCORE 5/19/2022 6/8/2022/8/2022 7/11/2022   PHQ-9 Total Score MyChart 14 (Moderate depression) 10 (Moderate depression) -   PHQ-9 Total Score 14 10 11       JULIENNE-7 scores:    JULIENNE-7 SCORE 7/4/2022   Total Score 21 (severe anxiety)   Total Score 21       Medical Review of Systems:  10 systems (general, cardiovascular, respiratory, eyes, ENT, endocrine, GI, , M/S, neurological) were reviewed. Most pertinent finding(s) is/are: See HPI above. The remaining systems are all unremarkable.    A 12-item WHODAS 2.0 assessment was not completed.    Psychiatric History:   Hospitalizations: None  History of Commitment? No   Past Treatment: counseling and medication(s) from physician / PCP, psychiatry  Suicide Attempts: No   Current Suicide Risk: Suicide Assessment Completed Today.  Self-injurious Behavior: Denies  Electroconvulsive Therapy (ECT) or Transcranial Magnetic Stimulation (TMS): No   GeneSight Genetic Testing: Yes     Past medication trials include but are not limited to:   Psych Meds at Intake:  Buspar - pt reported not taking   Klonopin - rare prn    Past Psych Meds:  escitalopram   venlafaxine  buspar   clonazepam  Likely others he cannot remember    Substance Use History:  Current Use of Drugs/Alcohol: 5-7 mixed drinks  of vodka every night  Past Use of Drugs/Alcohol: See above-this has been patient's pattern for the past 10 years but used to be beer and then patient switched to vodka 2-3 years ago  Patient reports no problems as a result of their drinking / drug use.   Patient has not received chemical dependency treatment in the past  Recovery Programming Involvement: None    Tobacco use: History     Based on the clinical interview, there  are indications of drug or alcohol abuse. Indications of alcohol dependence.. Continue to monitor.   Discussed effect of substance use on overall health.     Past Medical History:  Past Medical History:   Diagnosis Date     Acute appendicitis with peritoneal abscess 7/9/2013      Surgery:   Past Surgical History:   Procedure Laterality Date     LAPAROSCOPIC APPENDECTOMY  6/25/2013    Procedure: LAPAROSCOPIC APPENDECTOMY;  herniorrphory;  Surgeon: Philly Lynn MD;  Location: [facility] OR     Food and Medicine Allergies:   No Known Allergies  Seizures or Head Injury: No  Diet: No Restrictions  Exercise: Not discussed  Supplements: Reviewed per Electronic Medical Record Today    Current Medications:    Current Outpatient Medications:      busPIRone (BUSPAR) 15 MG tablet, 2 tablets orally in the morning, one tablet at night (Patient not taking: Reported on 8/2/2022), Disp: 60 tablet, Rfl: 1     clonazePAM (KLONOPIN) 0.5 MG tablet, TAKE 1 TABLET BY MOUTH TWO TIMES DAILY AS NEEDED FOR EXTREME ANXIETY OR PANIC, Disp: , Rfl:      magnesium oxide (MAG-OX) 400 MG tablet, Take 400 mg by mouth daily, Disp: , Rfl:      vitamin B complex with vitamin C (STRESS TAB) tablet, Take 1 tablet by mouth daily, Disp: , Rfl:      vitamin B complex with vitamin C (STRESS TAB) tablet, Take 1 tablet by mouth daily, Disp: , Rfl:     The [place] Prescription Monitoring Program has been reviewed and there are no concerns about diversionary activity for controlled substances at this time.    02/09/2022  1    02/09/2022  Clonazepam 0.5 MG Tablet    40.00  [address] [facility]   [id number]   [place] ([id number])   0/0  2.00 LME        Vital Signs:  None since this is a phone/video visit.     Labs:  Most recent laboratory results reviewed and the pertinent results include:   Office Visit on 06/09/2022   Component Date Value Ref Range Status     COLOGUARD-ABSTRACT 07/05/2022 Negative  Negative Final    Comment:   NEGATIVE TEST RESULT. A negative Cologuard result indicates a low likelihood that a colorectal cancer (CRC) or advanced adenoma (adenomatous polyps with more advanced pre-malignant features)  is present. The chance that a person with a negative Cologuard test has a colorectal cancer is less than 1 in 1500 (negative predictive value >99.9%) or has an  advanced adenoma is less than  5.3% (negative predictive value 94.7%). These data are based on a prospective cross-sectional study of 10,000 individuals at average risk for colorectal cancer who were screened with both Cologuard and colonoscopy. (Mega WARD. et al, N Engl J Med 2014;370(14):2771-3415) The normal value (reference range) for this assay is negative.    COLOGUARD RE-SCREENING RECOMMENDATION: Periodic colorectal cancer screening is an important part of preventive healthcare for asymptomatic individuals at average risk for colorectal cancer.  Following a negative Cologuard result, the American Cancer Society and U.S.                            Multi-Society Task Force screening guidelines recommend a Cologuard re-screening interval of 3 years.   References: American Cancer Society Guideline for Colorectal Cancer Screening: https://www.cancer.org/cancer/colon-rectal-cancer/uhkedtqhs-onrfmflpm-cdhsphe/acs-recommendations.html.; Nas HANDLEY, Mattie SHARMA, Ca BALTAZAR, Colorectal Cancer Screening: Recommendations for Physicians and Patients from the U.S. Multi-Society Task Force on Colorectal Cancer Screening , Am J Gastroenterology 2017; 112:5175-1811.    TEST DESCRIPTION: Composite  algorithmic analysis of stool DNA-biomarkers with hemoglobin immunoassay.   Quantitative values of individual biomarkers are not reportable and are not associated with individual biomarker result reference ranges. Cologuard is intended for colorectal cancer screening of adults of either sex, 45 years or older, who are at average-risk for colorectal cancer (CRC). Cologuard has been approved for use by the U.S. FDA. The performance of Cologuard was                            established in a cross sectional study of average-risk adults aged 50-84. Cologuard performance in patients ages 45 to 49 years was estimated by sub-group analysis of near-age groups. Colonoscopies performed for a positive result may find as the most clinically significant lesion: colorectal cancer [4.0%], advanced adenoma (including sessile serrated polyps greater than or equal to 1cm diameter) [20%] or non- advanced adenoma [31%]; or no colorectal neoplasia [45%]. These estimates are derived from a prospective cross-sectional screening study of 10,000 individuals at average risk for colorectal cancer who were screened with both Cologuard and colonoscopy. (Mega PRIEST et al, N Engl J Med 2014;370(14):8788-3902.) Cologuard may produce a false negative or false positive result (no colorectal cancer or precancerous polyp present at colonoscopy follow up). A negative Cologuard test result does not guarantee the absence of CRC or advanced adenoma (pre-cancer). The current Cologuard                            screening interval is every 3 years. (American Cancer Society and U.S. Multi-Society Task Force). Cologuard performance data in a 10,000 patient pivotal study using colonoscopy as the reference method can be accessed at the following location: www.[employer]9.Art [employer] the Dream/results. Additional description of the Cologuard test process, warnings and precautions can be found at [email or web address].       Cholesterol 06/09/2022 231 (A) <200 mg/dL Final      "Triglycerides 2022 78  <150 mg/dL Final     Direct Measure HDL 2022 58  >=40 mg/dL Final     LDL Cholesterol Calculated 2022 157 (A) <=100 mg/dL Final     Non HDL Cholesterol 2022 173 (A) <130 mg/dL Final     Patient Fasting > 8hrs? 2022 Yes   Final     HIV Antigen Antibody Combo 2022 Nonreactive  Nonreactive Final    HIV-1 p24 Ag & HIV-1/HIV-2 Ab Not Detected     Hepatitis C Antibody 2022 Nonreactive  Nonreactive Final     Most recent EKG from 1/3/2021 reviewed. QTc interval 401.      Family History:   Patient reported family history includes:   Family History   Problem Relation Age of Onset     Diabetes Father      Diabetes Maternal Grandfather      Diabetes Paternal Grandfather      Mental Illness History: Denies  Substance Abuse History: Denies  Suicide History: Denies  Medications: Unknown     Social History Per [facility], Dr. Alejo Cervantes, during today's team-based visit:   Patient reported they grew up in [place]  .  They were raised by biological parents  .  Parents  /  when he was 15yo. Only child.  Patient reported that their childhood was \"it was good. Nothing to complain about.\" Patient described their current relationships with family of origin as father  3 years ago, and mother is living but they are not very close.      The patient describes their cultural background as .  Cultural influences and impact on patient's life structure, values, norms, and healthcare: None.  Contextual influences on patient's health include: Individual Factors  for many years; multiple trauma exposures.    These factors will be addressed in the Preliminary Treatment plan. Patient identified their preferred language to be English. Patient reported they does not need the assistance of an  or other support involved in therapy.      Patient reported had no significant delays in developmental tasks.   Patient's highest education level " "was some college  .  Patient identified the following learning problems: none reported.  Modifications will not be used to assist communication in therapy. Patient reports they are  able to understand written materials.     Patient reported the following relationship history;  and divroced.  Patient's current relationship status is has a partner or significant other for 12 years.   Patient identified their sexual orientation as heterosexual.  Patient reported having 1 child(samia). Patient identified friends as part of their support system.  Patient identified the quality of these relationships as fair,  .  Good relationship with partner, \"we get along great. We have a lot of fun.\" Has several good friends. 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     Patient's current living/housing situation involves staying in own home/apartment.  The immediate members of family and household include Caitlin Jackson, 49,Domestic partner and they report that housing is stable.     Patient is currently employed fulltime. Paramedic for 15 years; was a  prior to that. Patient reports their finances are obtained through employment. Patient does not identify finances as a current stressor.      Firearms/Weapons Access: No: Patient denies   Service: No    Legal History:  No: Patient denies any legal history    Significant Losses / Trauma / Abuse / Neglect Issues:  There are indications or report of significant loss, trauma, abuse or neglect issues related to: See any pertinent information in HPI or social history above.   Issues of possible neglect are not present.     Comprehensive Examination (limited due to virtual visit format, phone/video):  Vital Signs:  Vitals: There were no vitals taken for this visit.  General/Constitutional:  Appearance: awake, alert, adequately groomed, appeared stated age and no apparent distress  Attitude:  cooperative, pleasant  Eye Contact:  " good  Musculoskeletal:  Muscle Strength and Tone: no gross abnormalities by observation  Psychomotor Behavior:  no evidence of tardive dyskinesia, dystonia, or tics  Gait and Station: normal, no gross abnormalities noted by observation  Psychiatric:  Speech:  clear, coherent, regular rate, rhythm, and volume  Associations:  no loose associations  Thought Process:  logical, linear and goal oriented  Thought Content:  no evidence of suicidal ideation or homicidal ideation, no evidence of psychotic thought, no auditory hallucinations present and no visual hallucinations present  Mood:  anxious  Affect:  appropriate and in normal range and mood congruent  Insight:  good  Judgment:  intact, adequate for safety  Impulse Control:  intact  Neurological:  Oriented to:  person, place, time, and situation  Attention Span and Concentration:  normal  Language: intact  Recent and Remote Memory:  Intact to interview. Not formally assessed. No amnesia.  Fund of Knowledge: appropriate    Strengths and Opportunities Per [facility], Dr. Alejo Cervantes, during today's team-based visit:   Patient identified the following strengths or resources that will help them succeed in treatment: commitment to health and well being, exercise routine, friends / good social support, family support, insight, intelligence, positive work environment, motivation, sense of humor, strong social skills and work ethic. Things that may interfere with the patient's success in treatment include: none identified.     Suicide Risk Assessment:  Today Jun Glez reports no suicidal ideation. Based on all available evidence including the factors cited above, Jun Glez does not appear to be at imminent risk for self-harm, does not meet criteria for a 72-hr hold, and therefore remains appropriate for ongoing outpatient level of care.  A thorough assessment of risk factors related to suicide and self-harm have been reviewed and are noted above. The patient  convincingly denies acute suicidality on several occasions. Local community safety resources printed for patient to use if needed. There was no deceit detected, and the patient presented in a manner that was believable.     DSM5  Diagnosis:  Generalized anxiety disorder  PTSD  Alcohol dependence, uncomplicated    Medical Comorbidities Include:   Patient Active Problem List    Diagnosis Date Noted     S/P laparoscopic appendectomy 06/25/2013     Priority: Medium       Impression:  Jun Glez is a 52-year-old male with past psychiatric history including PTSD and anxiety who presents today for psychiatric evaluation.  Patient has a history of working with psychiatric outpatient providers and also primary care provider regarding mental health symptoms.  Through questioning, it became quite apparent that the patient's difficult to treat anxiety symptoms are likely related to alcohol withdrawal he is experiencing on a near daily basis.  Patient reports feeling fine most mornings into late morning, very early afternoon but then by around noon time he will start to feel his blood pressure increased, pulse increase, will have increased sweating, sometimes stomach upset, and feeling of doom and significantly increased anxiety.  These are all symptoms of alcohol withdrawal.  Patient was educated on the symptoms and the pattern of alcohol withdrawal in relation to when he is drinking as 5-7 drinks every night.  This likely is becoming more of a thing the longer he has been drinking excessively and also ever since he switched to hard liquor 2-3 years ago (which correlates when the symptoms began occurring).  Patient was encouraged to decrease his alcohol use.  He declined the need for any chemical dependency resources or supports at this time.  If he cannot cut back on his own he is agreeable to reaching out to us for additional chemical dependency supports and resources for treatment or detox.  He is aware of the  available resources.  At this time, he is agreeable to prescriptions for clonidine and gabapentin to treat his withdrawal symptoms and to support his attempts in cutting back his alcohol use.  Discussed off label use for these medications for this purpose and also risks and benefits of these medications.  We also reviewed the risks of using clonazepam with alcohol and patient denies that he ever uses clonazepam with alcohol.  Individual psychotherapy strongly recommended.  No acute safety concerns or SI.  Patient will be seen back for follow-up.    Medication side effects and alternatives reviewed. Health promotion activities recommended and reviewed today. All questions addressed. Education and counseling completed regarding risks and benefits of medications and psychotherapy options. Recommend therapy for additional support.     Treatment Plan:    Start gabapentin for alcohol use and anxiety: Start with 300 mg twice daily and increase to 600 mg daily as tolerated.  Can increase as quickly as 1-2 days if no side effects.  Try 300 mg twice daily and then 300 mg in the morning and 600 mg at bedtime, and if still tolerating very well, try 600 mg twice daily.    Start clonidine for anxiety: Take 0.05- 0.1 mg every AM and 0.1-0.2 mg every bedtime.  Watch for feeling too lightheaded or dizzy.  Can also cause some sedation.    Continue to cut back on your alcohol use and try to abstain completely from alcohol for a while as you work towards better control of your mental health symptoms.  Discussed the podcast This Naked Mind.    Strongly recommend individual psychotherapy for additional support and ongoing development of nonpharmacologic coping skills and strategies.    Continue all other cares per primary care provider.     Continue all other medications as reviewed per electronic medical record today.     Safety plan reviewed. To the Emergency Department as needed or call after hours crisis line at [phone] or  "[phone]. [place] Crisis Text [place]: Text [place] to 683566  or  Suicide LifeLine Chat: suicidepreventionlifeline.org/chat    Schedule an appointment with me in 3-4 weeks or sooner as needed.  Call [facility] at [phone] to schedule.    Follow up with primary care provider as planned or sooner if needed for acute medical concerns.    Call the psychiatric nurse line with medication questions or concerns at [phone].    [employer] may be used to communicate with your provider, but this is not intended to be used for emergencies.    Patient Education:  Get Out of Your Mind & Into Your Life   By Vick Ledbetter    The Happiness Trap: How to Stop Struggling and Start Living  By Shadi Galloway    The Reality Slap: Finding Peace & Fulfillment When Life Hurts  By Shadi Galloway    Things Might Go Terribly, Horribly Wrong: A Guide to Life Liberated from Anxiety  By Helen Manning, PhD    Stress Less, Live More: How Acceptance and Commitment Therapy Can Help You Live a Busy Yet Balanced Life  By Earl Hamlin    Finding Life Beyond Trauma: Using Acceptance and Commitment Therapy to Heal From Post-Traumatic Stress and Trauma-Related Problems  By Aparna Gusman and Liane Treadwell    Other ACT Skills References     Shadi Galloway on YouTube - Demonstrates several different skills to deal with difficult thoughts and feelings     Book:  \"A Liberated Mind: How to Pivot Toward What Matters\" by Vick Ledbetter     Psychological flexibility: How love turns pain into purpose  Tedx Talk by Dr. Ledbetter discussing his struggle with anxiety and panic disorder which motivated him to develop ACT therapy (Acceptance and Commitment Therapy)     You Are Not Your Thoughts    Community Resources:    National Suicide Prevention Lifeline: 702.244.3940 (TTY: 534.127.4513). Call anytime for help.  (www.suicidepreventionlifeline.org)  National [place] on Mental Illness (www.sophia.org): 449.432.8029 or 066-570-0591.   Mental Health " Association (www.mentalhealth.org): 962-321-2472 or 024-798-3981.  [place] Crisis Text [place]: Text [place] to 210842  Suicide LifeLine Chat: suicidepreBoyibangline.org/chat    Administrative Billing:   Phone Call/Video Duration: 32 Minutes  Start: 2:04p  Stop: 2:36p    Patient Status:  Patient will continue to be seen for ongoing consultation and stabilization.    Signed:   Leigh Ann Elam DO  [facility]    Disclaimer: This note consists of symbols derived from keyboarding, dictation and/or voice recognition software. As a result, there may be errors in the script that have gone undetected. Please consider this when interpreting information found in this chart.

## 2022-08-04 NOTE — PATIENT INSTRUCTIONS
Treatment Plan:  Start gabapentin for alcohol use and anxiety: Start with 300 mg twice daily and increase to 600 mg daily as tolerated.  Can increase as quickly as 1-2 days if no side effects.  Try 300 mg twice daily and then 300 mg in the morning and 600 mg at bedtime, and if still tolerating very well, try 600 mg twice daily.  Start clonidine for anxiety: Take 0.05- 0.1 mg every AM and 0.1-0.2 mg every bedtime.  Watch for feeling too lightheaded or dizzy.  Can also cause some sedation.  Continue to cut back on your alcohol use and try to abstain completely from alcohol for a while as you work towards better control of your mental health symptoms.  Discussed the podcast This Naked Mind.  Strongly recommend individual psychotherapy for additional support and ongoing development of nonpharmacologic coping skills and strategies.  Continue all other cares per primary care provider.   Continue all other medications as reviewed per electronic medical record today.   Safety plan reviewed. To the Emergency Department as needed or call after hours crisis line at [phone] or [phone]. [place] Crisis [place]: Text [place] to 822085  or  Suicide LifeLine Chat: suicidepreventionlifeline.org/chat  Schedule an appointment with me in 3-4 weeks or sooner as needed.  Call [facility] at [phone] to schedule.  Follow up with primary care provider as planned or sooner if needed for acute medical concerns.  Call the psychiatric nurse line with medication questions or concerns at [phone].  [employer] may be used to communicate with your provider, but this is not intended to be used for emergencies.    Patient Education:  Get Out of Your Mind & Into Your Life   By Vick Ledbetter    The Happiness Trap: How to Stop Struggling and Start Living  By Shadi Galloway    The Reality Slap: Finding Peace & Fulfillment When Life Hurts  By Shadi Galloway    Things Might Go Terribly, Horribly Wrong: A Guide to Life Liberated  "from Anxiety  By Helen Manning, PhD    Stress Less, Live More: How Acceptance and Commitment Therapy Can Help You Live a Busy Yet Balanced Life  By Earl Hamlin    Finding Life Beyond Trauma: Using Acceptance and Commitment Therapy to Heal From Post-Traumatic Stress and Trauma-Related Problems  By Aparna Gusman and Liane Treadwell    Other ACT Skills References     Shadi Galloway on YouTube - Demonstrates several different skills to deal with difficult thoughts and feelings     Book:  \"A Liberated Mind: How to Pivot Toward What Matters\" by Vick Ledbetter     Psychological flexibility: How love turns pain into purpose  Tedx Talk by Dr. Ledbetter discussing his struggle with anxiety and panic disorder which motivated him to develop ACT therapy (Acceptance and Commitment Therapy)     You Are Not Your Thoughts    Community Resources:    National Suicide Prevention Lifeline: 982.564.3974 (TTY: 326.366.7451). Call anytime for help.  (www.suicidepreventionlifeline.org)  National [place] on Mental Illness (www.sophia.org): 142.750.1965 or 837-291-7881.   Mental Health Association (www.mentalhealth.org): 699.980.6465 or 992-519-6249.  [place] Crisis [place]: Text [place] to 160511  Suicide LifeLine Chat: suicidepreventionlifeline.org/chat  "

## 2022-09-13 ENCOUNTER — VIRTUAL VISIT (OUTPATIENT)
Dept: PSYCHIATRY | Facility: CLINIC | Age: 52
End: 2022-09-13
Payer: COMMERCIAL

## 2022-09-13 ENCOUNTER — VIRTUAL VISIT (OUTPATIENT)
Dept: BEHAVIORAL HEALTH | Facility: CLINIC | Age: 52
End: 2022-09-13
Payer: COMMERCIAL

## 2022-09-13 DIAGNOSIS — F43.10 PTSD (POST-TRAUMATIC STRESS DISORDER): ICD-10-CM

## 2022-09-13 DIAGNOSIS — F10.20 UNCOMPLICATED ALCOHOL DEPENDENCE (H): Primary | ICD-10-CM

## 2022-09-13 DIAGNOSIS — F41.1 GAD (GENERALIZED ANXIETY DISORDER): ICD-10-CM

## 2022-09-13 PROCEDURE — 99214 OFFICE O/P EST MOD 30 MIN: CPT | Mod: GT | Performed by: PSYCHIATRY & NEUROLOGY

## 2022-09-13 PROCEDURE — 90832 PSYTX W PT 30 MINUTES: CPT | Mod: 95 | Performed by: PSYCHOLOGIST

## 2022-09-13 RX ORDER — GABAPENTIN 300 MG/1
900 CAPSULE ORAL 3 TIMES DAILY
Qty: 270 CAPSULE | Refills: 1 | Status: SHIPPED | OUTPATIENT
Start: 2022-09-13 | End: 2022-11-01

## 2022-09-13 RX ORDER — CLONIDINE HYDROCHLORIDE 0.1 MG/1
TABLET ORAL
Qty: 120 TABLET | Refills: 1 | Status: SHIPPED | OUTPATIENT
Start: 2022-09-13 | End: 2022-10-04 | Stop reason: DRUGHIGH

## 2022-09-13 ASSESSMENT — PATIENT HEALTH QUESTIONNAIRE - PHQ9
SUM OF ALL RESPONSES TO PHQ QUESTIONS 1-9: 16
SUM OF ALL RESPONSES TO PHQ QUESTIONS 1-9: 16
10. IF YOU CHECKED OFF ANY PROBLEMS, HOW DIFFICULT HAVE THESE PROBLEMS MADE IT FOR YOU TO DO YOUR WORK, TAKE CARE OF THINGS AT HOME, OR GET ALONG WITH OTHER PEOPLE: SOMEWHAT DIFFICULT

## 2022-09-13 NOTE — PROGRESS NOTES
"Jun Glez is a 52 year old year old who is being evaluated via a billable video visit.      How would you like to obtain your AVS? MyChart  If you are dropped from the video visit, the video invite should be resent to: Text to cell phone: see Epic  Will anyone else be joining your video visit? No       Telemedicine Visit: The patient's condition can be safely assessed and treated via synchronous audio and visual telemedicine encounter.      Reason for Telemedicine Visit: Covid-19 Pandemic    Originating Site (Patient Location): Patient's home     Distant Site (Provider Location): Provider Remote Setting    Mode of Communication:  Video Conference via SnapMyAd    As the provider I attest to compliance with applicable laws and regulations related to telemedicine.        Outpatient Psychiatric Progress Note    Name: Jun Glez   : 1970                    Primary Care Provider: Physician No Ref-Primary   Therapist: None     PHQ-9 scores:  PHQ-9 SCORE 2022   PHQ-9 Total Score MyChart 10 (Moderate depression) - 16 (Moderately severe depression)   PHQ-9 Total Score 10 11 16       JULIENNE-7 scores:  JULIENNE-7 SCORE 2022   Total Score 21 (severe anxiety)   Total Score 21       Patient Identification:  Patient is a 52 year old, partnered / significant other  White  or  male  who presents for return visit with me.  Patient is currently employed full time. Patient attended the phone/video session alone. Patient prefers to be called: \"Jun\".    Interim History:  I last saw Jun Glez for outpatient psychiatry Consultation on 2022. During that appointment, we:      Start gabapentin for alcohol use and anxiety: Start with 300 mg twice daily and increase to 600 mg daily as tolerated.  Can increase as quickly as 1-2 days if no side effects.  Try 300 mg twice daily and then 300 mg in the morning and 600 mg at bedtime, and if still tolerating very well, try " 600 mg twice daily.    Start clonidine for anxiety: Take 0.05- 0.1 mg every AM and 0.1-0.2 mg every bedtime.  Watch for feeling too lightheaded or dizzy.  Can also cause some sedation.    Continue to cut back on your alcohol use and try to abstain completely from alcohol for a while as you work towards better control of your mental health symptoms.  Discussed the podcast This Naked Mind.    Strongly recommend individual psychotherapy for additional support and ongoing development of nonpharmacologic coping skills and strategies.    9/13: Patient reports some initial improvement of his symptoms that were quite significant after starting gabapentin.  Noticed not much when he started clonidine.  The clonidine for only a few days and then stopped the medication.  Was able to cut back significantly on alcohol use to about half of his prior amount.  Sleep, anxiety, and nightmares have worsened some since cutting back on alcohol use.  He admits to using alcohol to self medicate some of his anxiety and trauma related symptoms, particularly insomnia and nightmares.  He reports a lot of his dreams being very vivid.  He recalls going on a call for a woman who seemed very well put together with a nice house but had 1.75 L alcohol bottles empty strewn about her house and she was not doing well and was quite intoxicated while dry heaving.  He recalls not wanting this to be him ever.  He has not felt that out of control before but does not ever want to let it get to that point.  He denies any acute safety concerns.  No SI.  He denies that his alcohol use is causing any major problems.    Patient admits that he started out taking gabapentin 1 capsule twice daily then 2 capsules twice daily then was taking 4 capsules (1200) in the morning and 3 capsules (900) at bedtime.       Per [facility], Dr. Alejo Cervantes, during today's team-based visit:  Reported that gabapentin was working very well for him. However, it eventually it stopped working.  "When it was working, he was not having anxiety attacks during the day. \"It was amazing.\" However, the effects wore off after about 3 weeks. Now he notices that the anxiety lasts all day. However, clonidine did not make any difference for him which was consistent with his reported history. He cut back on his alcohol use to about half his prior amount. The nightmares happen every night though they do not always wake him up; they are vivid and thematically related to experiences he has had at work. He did not refill the clonidine and is open to starting it again if it will help with the anxiety. He continues to take magnesium and B-complex. We discussed therapy options. He reviewed recommendations offered last appointment and would like to work with an ART provider. Will work with him to find a therapist.    Past Psychiatric Med Trials:  Psych Meds at Intake:  Buspar - pt reported not taking   Klonopin - rare prn     Past Psych Meds:  escitalopram   venlafaxine  buspar   clonazepam  Likely others he cannot remember    Psychiatric ROS:  Jun Glez reports mood has been: Initially improved but then tapered off  Anxiety has been: Initially improved but then tapered off  Sleep has been: Initially improved but then tapered off significantly, see HPI above  Doris sxs: None  Psychosis sxs: None  ADHD/ADD sxs: N/A  PTSD sxs: See HPI above  PHQ9 and GAD7 scores were reviewed today if completed.   Medication side effects: Denies  Current stressors include: Symptoms and See HPI above  Coping mechanisms and supports include: Family, Hobbies and Friends    Current medications include:   Current Outpatient Medications   Medication Sig     clonazePAM (KLONOPIN) 0.5 MG tablet TAKE 1 TABLET BY MOUTH TWO TIMES DAILY AS NEEDED FOR EXTREME ANXIETY OR PANIC     cloNIDine (CATAPRES) 0.1 MG tablet Take half to one tab by mouth every AM and one to two tabs every bedtime.     gabapentin (NEURONTIN) 300 MG capsule Take 1-2 capsules " (300-600 mg) by mouth 2 times daily     magnesium oxide (MAG-OX) 400 MG tablet Take 400 mg by mouth daily     vitamin B complex with vitamin C (STRESS TAB) tablet Take 1 tablet by mouth daily     No current facility-administered medications for this visit.       The [place] Prescription Monitoring Program has been reviewed and there are no concerns about diversionary activity for controlled substances at this time.   08/29/2022  1   08/02/2022  Gabapentin 300 MG Capsule    120.00  [address]   [id number]   [place] ([id number][place]   1/1   [place]   [place]   08/02/2022  1   08/02/2022  Gabapentin 300 MG Capsule    120.00  [address]   [id number]     [id number] [place] ([id number])   0/1   [place]   02/09/2022  1   02/09/2022  Clonazepam 0.5 MG Tablet    40.00  [address]   [id number]   [place] ([id number])   0/0  2.00 LME  [place]   [place]         Past Medical/Surgical History:  Past Medical History:   Diagnosis Date     Acute appendicitis with peritoneal abscess 7/9/2013      has a past medical history of Acute appendicitis with peritoneal abscess (7/9/2013).    Social History:  Reviewed. No changes to social history except as noted above in HPI.    Vital Signs:   None. This is phone/video visit.     Labs:  Most recent laboratory results reviewed and no new labs.     Review of Systems:  10 systems (general, cardiovascular, respiratory, eyes, ENT, endocrine, GI, , M/S, neurological) were reviewed. Most pertinent finding(s) is/are: denies fever, cough, headaches, shortness of breath, chest pain, N/V, constipation/diarrhea, trouble urinating, aches and pains.. The remaining systems are all unremarkable.    Mental Status Examination (limited as this is by phone/video):  Appearance: Awake, alert, appears stated age, no acute distress, well-groomed   Attitude:  cooperative, pleasant   Motor: No gross abnormalities observed via video, not formally tested   Oriented to:  person, place, time, and situation  Attention Span and Concentration:  normal  Speech:  clear,  coherent, regular rate, rhythm, and volume  Language: intact  Mood:  anxious  Affect:  appropriate and in normal range and mood congruent  Associations:  no loose associations  Thought Process:  logical, linear and goal oriented  Thought Content:  no evidence of suicidal ideation or homicidal ideation, no evidence of psychotic thought, no auditory hallucinations present and no visual hallucinations present  Recent and Remote Memory:  Intact to interview. Not formally assessed. No amnesia.  Fund of Knowledge: appropriate  Insight:  good  Judgment:  intact, adequate for safety  Impulse Control:  intact    Suicide Risk Assessment:  Today Jun Glez reports no suicidal ideation. Based on all available evidence including the factors cited above, Jun Glez does not appear to be at imminent risk for self-harm, does not meet criteria for a 72-hr hold, and therefore remains appropriate for ongoing outpatient level of care.  A thorough assessment of risk factors related to suicide and self-harm have been reviewed and are noted above. The patient convincingly denies suicidality on several occasions. Local community safety resources printed and reviewed for patient to use if needed. There was no deceit detected, and the patient presented in a manner that was believable.     DSM5 Diagnosis:  Generalized anxiety disorder  PTSD  Alcohol dependence, uncomplicated    Medical comorbidities include:   Patient Active Problem List    Diagnosis Date Noted     S/P laparoscopic appendectomy 06/25/2013     Priority: Medium       Psychosocial & Contextual Factors: see HPI above    Assessment:  From Intake, 8/2/2022:  Jun Glez is a 52-year-old male with past psychiatric history including PTSD and anxiety who presents today for psychiatric evaluation.  Patient has a history of working with psychiatric outpatient providers and also primary care provider regarding mental health symptoms.  Through questioning, it became  quite apparent that the patient's difficult to treat anxiety symptoms are likely related to alcohol withdrawal he is experiencing on a near daily basis.  Patient reports feeling fine most mornings into late morning, very early afternoon but then by around noon time he will start to feel his blood pressure increased, pulse increase, will have increased sweating, sometimes stomach upset, and feeling of doom and significantly increased anxiety.  These are all symptoms of alcohol withdrawal.  Patient was educated on the symptoms and the pattern of alcohol withdrawal in relation to when he is drinking as 5-7 drinks every night.  This likely is becoming more of a thing the longer he has been drinking excessively and also ever since he switched to hard liquor 2-3 years ago (which correlates when the symptoms began occurring).  Patient was encouraged to decrease his alcohol use.  He declined the need for any chemical dependency resources or supports at this time.  If he cannot cut back on his own he is agreeable to reaching out to us for additional chemical dependency supports and resources for treatment or detox.  He is aware of the available resources.  At this time, he is agreeable to prescriptions for clonidine and gabapentin to treat his withdrawal symptoms and to support his attempts in cutting back his alcohol use.  Discussed off label use for these medications for this purpose and also risks and benefits of these medications.  We also reviewed the risks of using clonazepam with alcohol and patient denies that he ever uses clonazepam with alcohol.  Individual psychotherapy strongly recommended.  No acute safety concerns or SI.  Patient will be seen back for follow-up.    9/13/2022:  Patient overall with some improvement of symptoms, particularly initially.  Initial improvement unfortunately did not last very long.  Patient was able to decrease his alcohol use somewhat significantly.  Still trying to decrease use  further but still struggling some to eliminate completely.  Admits to using alcohol to self medicate.  Unclear still at this time what symptoms might be more related to anxiety and PTSD versus more subtle withdrawal.  Patient did not notice anything at all on lower dose of clonidine and so I do recommend titrating dose a little higher since I think decreased adrenergic drive will help quite significantly.  I think that will also help with nightmares and sleep.  Also discussed potentially adding mirtazapine at bedtime.  I would like to see him cut back a little more on alcohol use.  We will titrate gabapentin dose a little higher since it was quite helpful initially when first started.  Patient agrees to message me if he would like to change his dose of his medications.  No acute safety concerns.  No SI.  No drug use.    Medication side effects and alternatives were reviewed. Health promotion activities recommended and reviewed today. All questions addressed. Education and counseling completed regarding risks and benefits of medications and psychotherapy options. Recommend therapy for additional support.     Treatment Plan:    Crease gabapentin to 900 mg 3 times daily for anxiety and alcohol cravings.    Increase clonidine to 0.1-0.2 mg twice daily as tolerated.  Patient encouraged to titrate up to 0.2 mg twice daily as tolerated and watch for lightheadedness, dizziness.    Discussed naltrexone as an additional option to help with alcohol cravings and to help with abstinence from alcohol.    Patient declines need for other chemical dependency resources at this time.    Continue all other cares per primary care provider.     Continue all other medications as reviewed per electronic medical record today.     Safety plan reviewed. To the Emergency Department as needed or call after hours crisis line at [phone] or [phone]. [place] Crisis Text Line. Text [place] to 413750 or Suicide LifeLine Chat:  suicidepreventionlifeline.org/chat    Consider individual psychotherapy for additional support and ongoing development of nonpharmacologic coping skills and strategies.    Schedule an appointment with me in 3 weeks or sooner as needed. Call [facility] at [phone] to schedule.    Follow up with primary care provider as planned or for acute medical concerns.    Call the psychiatric nurse line with medication questions or concerns at [phone].    [employer] may be used to communicate with your provider, but this is not intended to be used for emergencies.    Administrative Billing:   Phone Call/Video Duration: 28 Minutes  Start: 9:30a  Stop: 9:58a    Time spent with patient was 28 minutes and greater than 50% of time or 15 minutes was spent in counseling and coordination of care regarding above diagnoses and treatment plan.    Patient Status:  Patient will continue to be seen for ongoing consultation and stabilization.    Signed:   Leigh Ann Elam DO  [facility]    Disclaimer: This note consists of symbols derived from keyboarding, dictation and/or voice recognition software. As a result, there may be errors in the script that have gone undetected. Please consider this when interpreting information found in this chart.

## 2022-09-13 NOTE — PATIENT INSTRUCTIONS
Treatment Plan:  Crease gabapentin to 900 mg 3 times daily for anxiety and alcohol cravings.  Increase clonidine to 0.1-0.2 mg twice daily as tolerated.  Patient encouraged to titrate up to 0.2 mg twice daily as tolerated and watch for lightheadedness, dizziness.  Discussed naltrexone as an additional option to help with alcohol cravings and to help with abstinence from alcohol.  Patient declines need for other chemical dependency resources at this time.  Continue all other cares per primary care provider.   Continue all other medications as reviewed per electronic medical record today.   Safety plan reviewed. To the Emergency Department as needed or call after hours crisis line at [phone] or [phone]. [place] Crisis Text [place]. Text [place] to 764639 or Suicide LifeLine Chat: suicidepreventionBEZ Systemsline.org/chat  Consider individual psychotherapy for additional support and ongoing development of nonpharmacologic coping skills and strategies.  Schedule an appointment with me in 3 weeks or sooner as needed. Call [facility] at [phone] to schedule.  Follow up with primary care provider as planned or for acute medical concerns.  Call the psychiatric nurse line with medication questions or concerns at [phone].  MyChart may be used to communicate with your provider, but this is not intended to be used for emergencies.

## 2022-09-13 NOTE — PROGRESS NOTES
[facility] Psychiatry Services [facility]  September 13, 2022      Behavioral Health Clinician Progress Note    Patient Name: Jun Glez           Service Type:  Individual      Service Location:   [employer] / Email (patient reached)     Session Start Time: 09:00 am  Session End Time: 09:25 am      Session Length: 16 - 37      Attendees: Patient     Service Modality:  Video Visit:      Provider verified identity through the following two step process.  Patient provided:  Patient is known previously to provider    Telemedicine Visit: The patient's condition can be safely assessed and treated via synchronous audio and visual telemedicine encounter.      Reason for Telemedicine Visit: Services only offered telehealth    Originating Site (Patient Location): Patient's home    Distant Site (Provider Location): Provider Remote Setting- Home Office    Consent:  The patient/guardian has verbally consented to: the potential risks and benefits of telemedicine (video visit) versus in person care; bill my insurance or make self-payment for services provided; and responsibility for payment of non-covered services.     Patient would like the video invitation sent by:  My Chart    Mode of Communication:  Video Conference via [facility]    As the provider I attest to compliance with applicable laws and regulations related to telemedicine.    Visit Activities (Refresh list every visit): [facility] Only    Diagnostic Assessment Date: 08/02/2022  Treatment Plan Review Date: 12/13/2022  See Flowsheets for today's PHQ-9 and JULIENNE-7 results  Previous PHQ-9:   PHQ-9 SCORE 6/8/2022 7/11/2022 9/13/2022   PHQ-9 Total Score MyChart 10 (Moderate depression) - 16 (Moderately severe depression)   PHQ-9 Total Score 10 11 16     Previous JULIENNE-7:   JULIENNE-7 SCORE 7/4/2022   Total Score 21 (severe anxiety)   Total Score 21       ISABELL LEVEL:  No flowsheet data found.    DATA  Extended Session (60+ minutes): No  Interactive Complexity:  "No  Crisis: No  [facility] Patient: No    Treatment Objective(s) Addressed in This Session:  Target Behavior(s): alcohol use and anxiety    Anxiety: will experience a reduction in anxiety and will develop more effective coping skills to manage anxiety symptoms  Alcohol / Substance Use: will increase understanding of the effects of substance use  will make healthier choices in regard to substance use  PTSD Symptom Management    Current Stressors / Issues:  Reported that gabapentin was working very well for him. However, it eventually it stopped working. When it was working, he was not having anxiety attacks during the day. \"It was amazing.\" However, the effects wore off after about 3 weeks. Now he notices that the anxiety lasts all day. However, clonidine did not make any difference for him which was consistent with his reported history. He cut back on his alcohol use to about half his prior amount. The nightmares happen every night though they do not always wake him up; they are vivid and thematically related to experiences he has had at work. He did not refill the clonidine and is open to starting it again if it will help with the anxiety. He continues to take magnesium and B-complex. We discussed therapy options. He reviewed recommendations offered last appointment and would like to work with an ART provider. Will work with him to find a therapist.      08/02/2022 (DA):  Reported that a few years ago he was having chest pain and thought he was having blood pressure problems. 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 Spoke with his colleague and was told it was panic attacks. He saw someone at [facility] but prefers to stay with [place]. [facility] tried a few medications and was not going well. Did GeneSight testing and had no help. Most recently was on Buspar and had side-effects and stopped it abruptly. He has tried 3 medications (escitalopram, venlaflaxine, buspar, and " "clonazepam) but they have not helped so much. He has started using magnesium and and vitamin B complex which has improved his sleep but still has nightmares. He has been diagnosed with JULIENNE and eventually PTSD. \"Right now I'm just dealing with it.\" He exercises 3-4x a week which helps with stress to some extent. He is having panic symptoms for 7-10 hours at a time. He does not feel like he is having panic attacks but is certainly having high anxiety and concerned with how his blood pressure increases. Had been working with a therapist at [facility] who suggested EMDR but he watched something on YouTube about it but it did not seem like something that would be helpful for him. Spoke about having anxiety about anticipating when his pager will go off and \"what will we see next?\" This is has been starting to happen more even on his days off which is new. He has decreased his hobbies the last few months which is also unusual for him.      Progress on Treatment Objective(s) / Homework:  No improvement - PREPARATION (Decided to change - considering how); Intervened by negotiating a change plan and determining options / strategies for behavior change, identifying triggers, exploring social supports, and working towards setting a date to begin behavior change    Also provided psychoeducation about behavioral health condition, symptoms, and treatment options    Care Plan review completed: Yes    Medication Review:  Changes to psychiatric medications, see updated Medication List in EPIC.     Medication Compliance:  Yes      Changes in Health Issues:   None reported    Chemical Use Review:   Substance Use: decrease in alcohol .  Patient reports frequency of use half as much from intake.  Stage of Change: Preparatory        Tobacco Use: No current tobacco use.      Assessment: Current Emotional / Mental Status (status of significant symptoms):  Risk status (Self / Other harm or suicidal ideation)  Patient denies a history of " suicidal ideation, suicide attempts, self-injurious behavior, homicidal ideation, homicidal behavior and and other safety concerns  Patient denies current fears or concerns for personal safety.  Patient denies current or recent suicidal ideation or behaviors.  Patient denies current or recent homicidal ideation or behaviors.  Patient denies current or recent self injurious behavior or ideation.  Patient denies other safety concerns.  A safety and risk management plan has not been developed at this time, however patient was encouraged to call [place] [phone] should there be a change in any of these risk factors.    Appearance:   Appropriate   Eye Contact:   Good   Psychomotor Behavior: Normal   Attitude:   Cooperative   Orientation:   All  Speech   Rate / Production: Normal    Volume:  Normal   Mood:    Anxious   Affect:    Appropriate   Thought Content:  Clear   Thought Form:  Coherent  Logical   Insight:    Good     Diagnoses:  1. Uncomplicated alcohol dependence (H)    2. JULIENNE (generalized anxiety disorder)    3. PTSD (post-traumatic stress disorder)        Collateral Reports Completed:  Communicated with: Dr. Elam    Plan: (Homework, other):  Patient was given information about behavioral services and encouraged to schedule a follow up appointment with the clinic [facility]  He was also given information about mental health symptoms and treatment options .  CD Recommendations: No indications of CD issues.  Alejo Cervantes PsyD, LP      ______________________________________________________________________    Integrated Primary Care Behavioral Health Treatment Plan    Patient's Name: Jun Glez  YOB: 1970    Date of Creation: September 13, 2022  Date Treatment Plan Last Reviewed/Revised: September 13, 2022    DSM5 Diagnoses: 300.02 (F41.1) Generalized Anxiety Disorder, 309.81 (F43.10) Posttraumatic Stress Disorder (includes Posttraumatic Stress Disorder for  Children 6 Years and Younger)  Without dissociative symptoms or Substance-Related & Addictive Disorders Alcohol Use Disorder   303.90 (F10.20) Moderate    Psychosocial / Contextual Factors: occupational stressors  PROMIS (reviewed every 90 days):   PROMIS 10-Global Health (only subscores and total score):   PROMIS-10 Scores Only 7/4/2022   Global Mental Health Score 8   Global Physical Health Score 14   PROMIS TOTAL - SUBSCORES 22       Referral / Collaboration:  Referral to another professional/service is not indicated at this time..    Anticipated number of session for this episode of care: 5-6  Anticipation frequency of session: As determined by Dr. Elam  Anticipated Duration of each session: 16-37 minutes  Treatment plan will be reviewed in 90 days or when goals have been changed.       MeasurableTreatment Goal(s) related to diagnosis / functional impairment(s)  Goal 1: Patient will work with providers to manage symptoms    I will know I've met my goal when I stop feeling so anxious.      Objective #A (Patient Action)  Patient will attend all appointments, take medication as prescribed.  Status: New - Date: 09/13/2022     Intervention(s)  [facility] will Monitor and assist in overcoming barriers to treatment adherence    Objective #B  Patient will consider all recommendations offered.  Status: New - Date: 09/13/2022      Intervention(s)  [facility] will educate patient on treatment options, clarify concerns, work with pt to overcome any resistance to compliance.      Patient has reviewed and agreed to the above plan.      Jaun Cervantes PsyD  September 13, 2022

## 2022-10-03 ASSESSMENT — ANXIETY QUESTIONNAIRES
IF YOU CHECKED OFF ANY PROBLEMS ON THIS QUESTIONNAIRE, HOW DIFFICULT HAVE THESE PROBLEMS MADE IT FOR YOU TO DO YOUR WORK, TAKE CARE OF THINGS AT HOME, OR GET ALONG WITH OTHER PEOPLE: SOMEWHAT DIFFICULT
2. NOT BEING ABLE TO STOP OR CONTROL WORRYING: MORE THAN HALF THE DAYS
GAD7 TOTAL SCORE: 17
GAD7 TOTAL SCORE: 17
6. BECOMING EASILY ANNOYED OR IRRITABLE: NEARLY EVERY DAY
3. WORRYING TOO MUCH ABOUT DIFFERENT THINGS: NEARLY EVERY DAY
8. IF YOU CHECKED OFF ANY PROBLEMS, HOW DIFFICULT HAVE THESE MADE IT FOR YOU TO DO YOUR WORK, TAKE CARE OF THINGS AT HOME, OR GET ALONG WITH OTHER PEOPLE?: SOMEWHAT DIFFICULT
7. FEELING AFRAID AS IF SOMETHING AWFUL MIGHT HAPPEN: NEARLY EVERY DAY
GAD7 TOTAL SCORE: 17
7. FEELING AFRAID AS IF SOMETHING AWFUL MIGHT HAPPEN: NEARLY EVERY DAY
5. BEING SO RESTLESS THAT IT IS HARD TO SIT STILL: SEVERAL DAYS
4. TROUBLE RELAXING: NEARLY EVERY DAY
1. FEELING NERVOUS, ANXIOUS, OR ON EDGE: MORE THAN HALF THE DAYS

## 2022-10-04 ENCOUNTER — VIRTUAL VISIT (OUTPATIENT)
Dept: PSYCHIATRY | Facility: CLINIC | Age: 52
End: 2022-10-04
Payer: COMMERCIAL

## 2022-10-04 ENCOUNTER — VIRTUAL VISIT (OUTPATIENT)
Dept: BEHAVIORAL HEALTH | Facility: CLINIC | Age: 52
End: 2022-10-04
Payer: COMMERCIAL

## 2022-10-04 DIAGNOSIS — F43.10 PTSD (POST-TRAUMATIC STRESS DISORDER): ICD-10-CM

## 2022-10-04 DIAGNOSIS — F10.21 ALCOHOL DEPENDENCE IN REMISSION (H): ICD-10-CM

## 2022-10-04 DIAGNOSIS — F41.1 GAD (GENERALIZED ANXIETY DISORDER): Primary | ICD-10-CM

## 2022-10-04 DIAGNOSIS — F41.1 GAD (GENERALIZED ANXIETY DISORDER): ICD-10-CM

## 2022-10-04 DIAGNOSIS — F43.10 PTSD (POST-TRAUMATIC STRESS DISORDER): Primary | ICD-10-CM

## 2022-10-04 DIAGNOSIS — F10.20 UNCOMPLICATED ALCOHOL DEPENDENCE (H): ICD-10-CM

## 2022-10-04 PROCEDURE — 90832 PSYTX W PT 30 MINUTES: CPT | Mod: 95 | Performed by: PSYCHOLOGIST

## 2022-10-04 PROCEDURE — 99214 OFFICE O/P EST MOD 30 MIN: CPT | Mod: 95 | Performed by: PSYCHIATRY & NEUROLOGY

## 2022-10-04 RX ORDER — CLONIDINE HYDROCHLORIDE 0.1 MG/1
0.2 TABLET ORAL AT BEDTIME
COMMUNITY
End: 2022-10-26

## 2022-10-04 RX ORDER — VILAZODONE HYDROCHLORIDE 10 MG/1
10 TABLET ORAL DAILY
Qty: 30 TABLET | Refills: 1 | Status: SHIPPED | OUTPATIENT
Start: 2022-10-04 | End: 2022-11-01 | Stop reason: ALTCHOICE

## 2022-10-04 NOTE — PROGRESS NOTES
[facility] Psychiatry Services [facility]  October 4, 2022      Behavioral Health Clinician Progress Note    Patient Name: Jun Glez           Service Type:  Individual      Service Location:   [employer] / Email (patient reached)     Session Start Time: 09:30 am  Session End Time: 09:55 am      Session Length: 16 - 37      Attendees: Patient     Service Modality:  Video Visit:      Provider verified identity through the following two step process.  Patient provided:  Patient is known previously to provider    Telemedicine Visit: The patient's condition can be safely assessed and treated via synchronous audio and visual telemedicine encounter.      Reason for Telemedicine Visit: Services only offered telehealth    Originating Site (Patient Location): Patient's home    Distant Site (Provider Location): Provider Remote Setting- Home Office    Consent:  The patient/guardian has verbally consented to: the potential risks and benefits of telemedicine (video visit) versus in person care; bill my insurance or make self-payment for services provided; and responsibility for payment of non-covered services.     Patient would like the video invitation sent by:  My Chart    Mode of Communication:  Video Conference via [facility]    As the provider I attest to compliance with applicable laws and regulations related to telemedicine.    Visit Activities (Refresh list every visit): [facility] Only    Diagnostic Assessment Date: 08/02/2022  Treatment Plan Review Date: 12/13/2022  See Flowsheets for today's PHQ-9 and JULIENNE-7 results  Previous PHQ-9:   PHQ-9 SCORE 6/8/2022 7/11/2022 9/13/2022   PHQ-9 Total Score MyChart 10 (Moderate depression) - 16 (Moderately severe depression)   PHQ-9 Total Score 10 11 16     Previous JULIENNE-7:   JULIENNE-7 SCORE 7/4/2022 10/3/2022   Total Score 21 (severe anxiety) 17 (severe anxiety)   Total Score 21 17       ISABELL LEVEL:  No flowsheet data found.    DATA  Extended Session (60+ minutes):  "No  Interactive Complexity: No  Crisis: No  [facility] Patient: No    Treatment Objective(s) Addressed in This Session:  Target Behavior(s): alcohol use and anxiety    Anxiety: will experience a reduction in anxiety and will develop more effective coping skills to manage anxiety symptoms  Alcohol / Substance Use: will increase understanding of the effects of substance use  will make healthier choices in regard to substance use  PTSD Symptom Management    Current Stressors / Issues:  Reported that gabapentin seems to have worked to some extent. He notices that he is a little more relaxed. He has only been using 1 clonidine in the morning because it was too sedating. He finds that his nightmares are more graphic since he stopped drinking. He has chosen to cut back on alcohol and has not had anything to drink in almost 2 weeks. He spoke about his search for a therapist and is awaiting a call back from some places.      09/13/2022:  Reported that gabapentin was working very well for him. However, it eventually it stopped working. When it was working, he was not having anxiety attacks during the day. \"It was amazing.\" However, the effects wore off after about 3 weeks. Now he notices that the anxiety lasts all day. However, clonidine did not make any difference for him which was consistent with his reported history. He cut back on his alcohol use to about half his prior amount. The nightmares happen every night though they do not always wake him up; they are vivid and thematically related to experiences he has had at work. He did not refill the clonidine and is open to starting it again if it will help with the anxiety. He continues to take magnesium and B-complex. We discussed therapy options. He reviewed recommendations offered last appointment and would like to work with an ART provider. Will work with him to find a therapist.      08/02/2022 (DA):  Reported that a few years ago he was having chest pain and thought he was " "having blood pressure problems. 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 Spoke with his colleague and was told it was panic attacks. He saw someone at [facility] but prefers to stay with [place]. [facility] tried a few medications and was not going well. Did GeneSight testing and had no help. Most recently was on Buspar and had side-effects and stopped it abruptly. He has tried 3 medications (escitalopram, venlaflaxine, buspar, and clonazepam) but they have not helped so much. He has started using magnesium and and vitamin B complex which has improved his sleep but still has nightmares. He has been diagnosed with JULIENNE and eventually PTSD. \"Right now I'm just dealing with it.\" He exercises 3-4x a week which helps with stress to some extent. He is having panic symptoms for 7-10 hours at a time. He does not feel like he is having panic attacks but is certainly having high anxiety and concerned with how his blood pressure increases. Had been working with a therapist at [facility] who suggested EMDR but he watched something on YouTube about it but it did not seem like something that would be helpful for him. Spoke about having anxiety about anticipating when his pager will go off and \"what will we see next?\" This is has been starting to happen more even on his days off which is new. He has decreased his hobbies the last few months which is also unusual for him.      Progress on Treatment Objective(s) / Homework:  Minimal progress - PREPARATION (Decided to change - considering how); Intervened by negotiating a change plan and determining options / strategies for behavior change, identifying triggers, exploring social supports, and working towards setting a date to begin behavior change    Also provided psychoeducation about behavioral health condition, symptoms, and treatment options    Care Plan review completed: Yes    Medication Review:  Changes to psychiatric medications, see " updated Medication List in EPIC.     Medication Compliance:  Yes      Changes in Health Issues:   None reported    Chemical Use Review:   Substance Use: decrease in alcohol .  Patient reports frequency of use half as much from intake.  Stage of Change: Preparatory        Tobacco Use: No current tobacco use.      Assessment: Current Emotional / Mental Status (status of significant symptoms):  Risk status (Self / Other harm or suicidal ideation)  Patient denies a history of suicidal ideation, suicide attempts, self-injurious behavior, homicidal ideation, homicidal behavior and and other safety concerns  Patient denies current fears or concerns for personal safety.  Patient denies current or recent suicidal ideation or behaviors.  Patient denies current or recent homicidal ideation or behaviors.  Patient denies current or recent self injurious behavior or ideation.  Patient denies other safety concerns.  A safety and risk management plan has not been developed at this time, however patient was encouraged to call [place] [phone] should there be a change in any of these risk factors.    Appearance:   Appropriate   Eye Contact:   Good   Psychomotor Behavior: Normal   Attitude:   Cooperative   Orientation:   All  Speech   Rate / Production: Normal    Volume:  Normal   Mood:    Anxious   Affect:    Appropriate   Thought Content:  Clear   Thought Form:  Coherent  Logical   Insight:    Good     Diagnoses:  1. PTSD (post-traumatic stress disorder)    2. Uncomplicated alcohol dependence (H)    3. JULIENNE (generalized anxiety disorder)        Collateral Reports Completed:  Communicated with: Dr. Elam    Plan: (Homework, other):  Patient was given information about behavioral services and encouraged to schedule a follow up appointment with the clinic [facility]  He was also given information about mental health symptoms and treatment options .  CD Recommendations: No indications of CD issues.   Alejo Billy, PsyD, LP      ______________________________________________________________________    Integrated Primary Care Behavioral Health Treatment Plan    Patient's Name: Jun Glez  YOB: 1970    Date of Creation: September 13, 2022  Date Treatment Plan Last Reviewed/Revised: September 13, 2022    DSM5 Diagnoses: 300.02 (F41.1) Generalized Anxiety Disorder, 309.81 (F43.10) Posttraumatic Stress Disorder (includes Posttraumatic Stress Disorder for Children 6 Years and Younger)  Without dissociative symptoms or Substance-Related & Addictive Disorders Alcohol Use Disorder   303.90 (F10.20) Moderate    Psychosocial / Contextual Factors: occupational stressors  PROMIS (reviewed every 90 days):   PROMIS 10-Global Health (only subscores and total score):   PROMIS-10 Scores Only 7/4/2022 10/3/2022   Global Mental Health Score 8 9   Global Physical Health Score 14 16   PROMIS TOTAL - SUBSCORES 22 25       Referral / Collaboration:  Referral to another professional/service is not indicated at this time..    Anticipated number of session for this episode of care: 5-6  Anticipation frequency of session: As determined by Dr. Elam  Anticipated Duration of each session: 16-37 minutes  Treatment plan will be reviewed in 90 days or when goals have been changed.       MeasurableTreatment Goal(s) related to diagnosis / functional impairment(s)  Goal 1: Patient will work with providers to manage symptoms    I will know I've met my goal when I stop feeling so anxious.      Objective #A (Patient Action)  Patient will attend all appointments, take medication as prescribed.  Status: New - Date: 09/13/2022     Intervention(s)  [facility] will Monitor and assist in overcoming barriers to treatment adherence    Objective #B  Patient will consider all recommendations offered.  Status: New - Date: 09/13/2022      Intervention(s)  [facility] will educate patient on treatment options, clarify concerns, work with pt to overcome any  resistance to compliance.      Patient has reviewed and agreed to the above plan.      Jaun Cervantes PsyD  10/04/2022

## 2022-10-04 NOTE — PATIENT INSTRUCTIONS
Treatment Plan:  Continue gabapentin 900 mg 3 times daily for anxiety and alcohol cravings.  Decrease clonidine: After stable on Viibryd 10 mg daily decrease a.m. clonidine dose to 0.05 mg for about 3-5 days and then discontinue a.m. clonidine dose.  Can continue with 0.2 mg bedtime dose until follow-up visit.   Start Viibryd/vilazodone 10 mg daily for anxiety, PTSD.  Can message me in a couple weeks if tolerating well to consider increasing dose to 20 mg daily.  Continue all other cares per primary care provider.   Continue all other medications as reviewed per electronic medical record today.   Safety plan reviewed. To the Emergency Department as needed or call after hours crisis line at [phone] or [phone]. [place] Crisis Text Line. Text [place] to 096745 or Suicide LifeLine Chat: suicidepreventionlifeline.org/chat  Consider individual psychotherapy for additional support and ongoing development of nonpharmacologic coping skills and strategies.  Schedule an appointment with me in 4 weeks or sooner as needed. Call [facility] at [phone] to schedule.  Follow up with primary care provider as planned or for acute medical concerns.  Call the psychiatric nurse line with medication questions or concerns at [phone].  MyChart may be used to communicate with your provider, but this is not intended to be used for emergencies.

## 2022-10-04 NOTE — Clinical Note
Please call this patient to get them scheduled for a follow-up visit in 4 weeks. Please schedule with me and the [facility]. Thanks!

## 2022-10-12 ENCOUNTER — TRANSFERRED RECORDS (OUTPATIENT)
Dept: HEALTH INFORMATION MANAGEMENT | Facility: CLINIC | Age: 52
End: 2022-10-12

## 2022-10-16 ENCOUNTER — HEALTH MAINTENANCE LETTER (OUTPATIENT)
Age: 52
End: 2022-10-16

## 2022-10-26 ENCOUNTER — OFFICE VISIT (OUTPATIENT)
Dept: FAMILY MEDICINE | Facility: CLINIC | Age: 52
End: 2022-10-26
Payer: COMMERCIAL

## 2022-10-26 VITALS
BODY MASS INDEX: 32.75 KG/M2 | WEIGHT: 255.2 LBS | HEIGHT: 74 IN | SYSTOLIC BLOOD PRESSURE: 110 MMHG | DIASTOLIC BLOOD PRESSURE: 76 MMHG | RESPIRATION RATE: 16 BRPM | OXYGEN SATURATION: 97 % | TEMPERATURE: 97.6 F | HEART RATE: 92 BPM

## 2022-10-26 DIAGNOSIS — S83.242A TEAR OF MEDIAL MENISCUS OF LEFT KNEE, CURRENT, UNSPECIFIED TEAR TYPE, INITIAL ENCOUNTER: ICD-10-CM

## 2022-10-26 DIAGNOSIS — F41.1 GAD (GENERALIZED ANXIETY DISORDER): ICD-10-CM

## 2022-10-26 DIAGNOSIS — Z01.818 PREOP GENERAL PHYSICAL EXAM: Primary | ICD-10-CM

## 2022-10-26 DIAGNOSIS — F43.10 PTSD (POST-TRAUMATIC STRESS DISORDER): ICD-10-CM

## 2022-10-26 PROCEDURE — 99214 OFFICE O/P EST MOD 30 MIN: CPT | Performed by: FAMILY MEDICINE

## 2022-10-26 ASSESSMENT — PAIN SCALES - GENERAL: PAINLEVEL: NO PAIN (0)

## 2022-10-26 NOTE — PATIENT INSTRUCTIONS
You may take all medications on day of surgery with only a small sip of water.    Do not take Ibuprofen, Aspirin or Naproxen from now until after procedure.  If you need to take something for pain, take Acetaminophen 325 mg orally 1-2 tabs every 4-6 hrs as needed for pain   Preparing for Your Surgery  Getting started  A nurse will call you to review your health history and instructions. They will give you an arrival time based on your scheduled surgery time. Please be ready to share:  Your doctor's clinic name and phone number  Your medical, surgical and anesthesia history  A list of allergies and sensitivities  A list of medicines, including herbal treatments and over-the-counter drugs  Whether the patient has a legal guardian (ask how to send us the papers in advance)  Please tell us if you're pregnant--or if there's any chance you might be pregnant. Some surgeries may injure a fetus (unborn baby), so they require a pregnancy test. Surgeries that are safe for a fetus don't always need a test, and you can choose whether to have one.   If you have a child who's having surgery, please ask for a copy of Preparing for Your Child's Surgery.    Preparing for surgery  Within 10 to 30 days of surgery: Have a pre-op exam (sometimes called an H&P, or History and Physical). This can be done at a clinic or pre-operative center.  If you're having a , you may not need this exam. Talk to your care team.  At your pre-op exam, talk to your care team about all medicines you take. If you need to stop any medicines before surgery, ask when to start taking them again.  We do this for your safety. Many medicines can make you bleed too much during surgery. Some change how well surgery (anesthesia) drugs work.  Call your insurance company to let them know you're having surgery. (If you don't have insurance, call [phone].)  Call your clinic if there's any change in your health. This includes signs of a cold or flu (sore  throat, runny nose, cough, rash, fever). It also includes a scrape or scratch near the surgery site.  If you have questions on the day of surgery, call your hospital or surgery center.  COVID testing  You may need to be tested for COVID-19 before having surgery. If so, we will give you instructions (or click here).  Eating and drinking guidelines  For your safety: Unless your surgeon tells you otherwise, follow the guidelines below.  Eat and drink as usual until 8 hours before surgery. After that, no food or milk.  Drink clear liquids until 2 hours before surgery. These are liquids you can see through, like water, Gatorade and Propel Water. You may also have black coffee and tea (no cream or milk).  Nothing by mouth within 2 hours of surgery. This includes gum, candy and breath mints.  If you drink alcohol: Stop drinking it the night before surgery.  If your care team tells you to take medicine on the morning of surgery, it's okay to take it with a sip of water.  Preventing infection  Shower or bathe the night before and morning of your surgery. Follow the instructions your clinic gave you. (If no instructions, use regular soap.)  Don't shave or clip hair near your surgery site. We'll remove the hair if needed.  Don't smoke or vape the morning of surgery. You may chew nicotine gum up to 2 hours before surgery. A nicotine patch is okay.  Note: Some surgeries require you to completely quit smoking and nicotine. Check with your surgeon.  Your care team will make every effort to keep you safe from infection. We will:  Clean our hands often with soap and water (or an alcohol-based hand rub).  Clean the skin at your surgery site with a special soap that kills germs.  Give you a special gown to keep you warm. (Cold raises the risk of infection.)  Wear special hair covers, masks, gowns and gloves during surgery.  Give antibiotic medicine, if prescribed. Not all surgeries need antibiotics.  What to bring on the day of  surgery  Photo ID and insurance card  Copy of your health care directive, if you have one  Glasses and hearing aides (bring cases)  You can't wear contacts during surgery  Inhaler and eye drops, if you use them (tell us about these when you arrive)  CPAP machine or breathing device, if you use them  A few personal items, if spending the night  If you have . . .  A pacemaker, ICD (cardiac defibrillator) or other implant: Bring the ID card.  An implanted stimulator: Bring the remote control.  A legal guardian: Bring a copy of the certified (court-stamped) guardianship papers.  Please remove any jewelry, including body piercings. Leave jewelry and other valuables at home.  If you're going home the day of surgery  You must have a responsible adult drive you home. They should stay with you overnight as well.  If you don't have someone to stay with you, and you aren't safe to go home alone, we may keep you overnight. Insurance often won't pay for this.  After surgery  If it's hard to control your pain or you need more pain medicine, please call your surgeon's office.  Questions?   If you have any questions for your care team, list them here: _________________________________________________________________________________________________________________________________________________________________________ ____________________________________ ____________________________________ ____________________________________  For informational purposes only. Not to replace the advice of your health care provider. Copyright   2003, 2019 [facility]. All rights reserved. Clinically reviewed by Kasie Graves MD. Livra Panels [id number] - REV 07/22.

## 2022-10-26 NOTE — PROGRESS NOTES
[facility]  [address] [place] [address]  Phone: [phone]  Primary Provider: No Ref-Primary, Physician  Pre-op Performing Provider: DIANA HOPE      PREOPERATIVE EVALUATION:  Today's date: 10/26/2022    Jun Glez is a 52 year old male who presents for a preoperative evaluation.    Surgical Information:  Surgery/Procedure: left knee arthroscopy  Surgery Location: [facility]  Surgeon: Dr. Rivas  Surgery Date: 10/28/22  Time of Surgery: 2:45  Where patient plans to recover: At home with family  Fax number for surgical facility: [phone]    Type of Anesthesia Anticipated: Femoral Block    Assessment & Plan     The proposed surgical procedure is considered INTERMEDIATE risk.    Preop general physical exam    Tear of medial meniscus of left knee, current, unspecified tear type, initial encounter    JULIENNE (generalized anxiety disorder)  PTSD (post-traumatic stress disorder)  Manageable per patient. No acute concern about these.  May continue Vibryd and gabapentin as prescribed.  See behavioral therapist.             Risks and Recommendations:  The patient has the following additional risks and recommendations for perioperative complications:   - No identified additional risk factors other than previously addressed    Medication Instructions:  Patient is to take all scheduled medications on the day of surgery    RECOMMENDATION:  APPROVAL GIVEN to proceed with proposed procedure, without further diagnostic evaluation.  6}    Subjective     HPI related to upcoming procedure: Patient has acute medial meniscus tear on left knee causing recurrent knee pain. Hence, planned surgery. Reviewed above with patient.    Preop Questions 10/26/2022   1. Have you ever had a heart attack or stroke? No   2. Have you ever had surgery on your heart or blood vessels, such as a stent placement, a coronary artery bypass, or surgery on an artery in your  head, neck, heart, or legs? No   3. Do you have chest pain with activity? No   4. Do you have a history of  heart failure? No   5. Do you currently have a cold, bronchitis or symptoms of other infection? No   6. Do you have a cough, shortness of breath, or wheezing? No   7. Do you or anyone in your family have previous history of blood clots? No   8. Do you or does anyone in your family have a serious bleeding problem such as prolonged bleeding following surgeries or cuts? No   9. Have you ever had problems with anemia or been told to take iron pills? No   10. Have you had any abnormal blood loss such as black, tarry or bloody stools? No   11. Have you ever had a blood transfusion? No   12. Are you willing to have a blood transfusion if it is medically needed before, during, or after your surgery? Yes   13. Have you or any of your relatives ever had problems with anesthesia? No   14. Do you have sleep apnea, excessive snoring or daytime drowsiness? No   15. Do you have any artifical heart valves or other implanted medical devices like a pacemaker, defibrillator, or continuous glucose monitor? No   16. Do you have artificial joints? No   17. Are you allergic to latex? No       Health Care Directive:  Patient does not have a Health Care Directive or Living Will: Discussed advance care planning with patient; information given to patient to review.    Preoperative Review of :   reviewed - no record of controlled substances prescribed.      Status of Chronic Conditions:  See problem list for active medical problems.  Problems all longstanding and stable, except as noted/documented.  See ROS for pertinent symptoms related to these conditions.    DEPRESSION/ANXIETY/PTSD - Patient has a long history of Depression of moderate severity requiring medication for control with recent symptoms being MANAGEABLE..Current symptoms of depression include none.       Review of Systems  CONSTITUTIONAL: NEGATIVE for fever, chills,  change in weight  INTEGUMENTARY/SKIN: NEGATIVE for worrisome rashes, moles or lesions  EYES: NEGATIVE for vision changes or irritation  ENT/MOUTH: NEGATIVE for ear, mouth and throat problems  RESP: NEGATIVE for significant cough or SOB  CV: NEGATIVE for chest pain, palpitations or peripheral edema  GI: NEGATIVE for nausea, abdominal pain, heartburn, or change in bowel habits  : NEGATIVE for frequency, dysuria, or hematuria  MUSCULOSKELETAL:LEFT KNEE PAIN  NEURO: NEGATIVE for weakness, dizziness or paresthesias  ENDOCRINE: NEGATIVE for temperature intolerance, skin/hair changes  HEME: NEGATIVE for bleeding problems  PSYCHIATRIC: NEGATIVE for changes in mood or affect    Patient Active Problem List    Diagnosis Date Noted     S/P laparoscopic appendectomy 06/25/2013     Priority: Medium      Past Medical History:   Diagnosis Date     Acute appendicitis with peritoneal abscess 07/09/2013     Hypertension      Past Surgical History:   Procedure Laterality Date     GI SURGERY  06/25/2013     LAPAROSCOPIC APPENDECTOMY  06/25/2013    Procedure: LAPAROSCOPIC APPENDECTOMY;  herniorrphory;  Surgeon: Philly Lynn MD;  Location: [facility] OR     Current Outpatient Medications   Medication Sig Dispense Refill     gabapentin (NEURONTIN) 300 MG capsule Take 3 capsules (900 mg) by mouth 3 times daily 270 capsule 1     magnesium oxide (MAG-OX) 400 MG tablet Take 400 mg by mouth daily       vilazodone (VIIBRYD) 10 MG TABS tablet Take 1 tablet (10 mg) by mouth daily 30 tablet 1     vitamin B complex with vitamin C (STRESS TAB) tablet Take 1 tablet by mouth daily         No Known Allergies     Social History     Tobacco Use     Smoking status: Former     Packs/day: 0.50     Years: 5.00     Pack years: 2.50     Types: Cigarettes     Smokeless tobacco: Never   Substance Use Topics     Alcohol use: Yes     Comment: 5-6 drinks daily     Family History   Problem Relation Age of Onset     Diabetes Father      Diabetes  "Maternal Grandfather      Diabetes Paternal Grandfather      History   Drug Use No         Objective     /76 (BP Location: Left arm, Patient Position: Chair, Cuff Size: Adult Large)   Pulse 92   Temp 97.6  F (36.4  C) (Tympanic)   Resp 16   Ht 1.88 m (6' 2\")   Wt 115.8 kg (255 lb 3.2 oz)   SpO2 97%   BMI 32.77 kg/m      Physical Exam  GENERAL APPEARANCE: obese, alert and no distress; ambulatory w/o assist  EYES: pink conj, no icterus, PERRL, EOMI  HENT: ear canals and TM's normal, nose and mouth without ulcers or lesions, oropharynx clear and oral mucous membranes moist  NECK: no adenopathy, no asymmetry, masses, or scars and thyroid normal to palpation  RESP: lungs clear to auscultation - no rales, rhonchi or wheezes  CV: regular rates and rhythm, normal S1 S2, no S3 or S4, no murmur, click or rub, no peripheral edema and peripheral pulses strong  ABDOMEN: soft, nontender, no hepatosplenomegaly, no masses and bowel sounds normal  MS: no musculoskeletal defects are noted and gait is age appropriate without ataxia  SKIN: good turgor, no rash/jaundice/ecchymosis  NEURO: Normal strength and tone, sensory exam grossly normal, mentation intact and speech normal    Recent Labs   Lab Test 01/03/21  1528   HGB 15.5         POTASSIUM 3.6   CR 1.16        Diagnostics:  No labs were ordered during this visit.   No EKG required, no history of coronary heart disease, significant arrhythmia, peripheral arterial disease or other structural heart disease.    Revised Cardiac Risk Index (RCRI):  The patient has the following serious cardiovascular risks for perioperative complications:   - No serious cardiac risks = 0 points     RCRI Interpretation: 0 points: Class I (very low risk - 0.4% complication rate)           Signed Electronically by: Kahlil Denny MD  Copy of this evaluation report is provided to requesting physician.      "

## 2022-10-28 ENCOUNTER — TRANSFERRED RECORDS (OUTPATIENT)
Dept: HEALTH INFORMATION MANAGEMENT | Facility: CLINIC | Age: 52
End: 2022-10-28

## 2022-11-01 ENCOUNTER — VIRTUAL VISIT (OUTPATIENT)
Dept: PSYCHIATRY | Facility: CLINIC | Age: 52
End: 2022-11-01
Payer: COMMERCIAL

## 2022-11-01 ENCOUNTER — VIRTUAL VISIT (OUTPATIENT)
Dept: BEHAVIORAL HEALTH | Facility: CLINIC | Age: 52
End: 2022-11-01
Payer: COMMERCIAL

## 2022-11-01 DIAGNOSIS — F41.1 GAD (GENERALIZED ANXIETY DISORDER): Primary | ICD-10-CM

## 2022-11-01 DIAGNOSIS — F10.20 UNCOMPLICATED ALCOHOL DEPENDENCE (H): ICD-10-CM

## 2022-11-01 DIAGNOSIS — F43.10 PTSD (POST-TRAUMATIC STRESS DISORDER): ICD-10-CM

## 2022-11-01 PROCEDURE — 99214 OFFICE O/P EST MOD 30 MIN: CPT | Mod: 95 | Performed by: PSYCHIATRY & NEUROLOGY

## 2022-11-01 PROCEDURE — 90832 PSYTX W PT 30 MINUTES: CPT | Mod: 95 | Performed by: PSYCHOLOGIST

## 2022-11-01 RX ORDER — GABAPENTIN 300 MG/1
900 CAPSULE ORAL 3 TIMES DAILY
Qty: 270 CAPSULE | Refills: 1 | Status: SHIPPED | OUTPATIENT
Start: 2022-11-01 | End: 2023-03-28

## 2022-11-01 RX ORDER — VILAZODONE HYDROCHLORIDE 20 MG/1
20 TABLET ORAL DAILY
Qty: 30 TABLET | Refills: 1 | Status: SHIPPED | OUTPATIENT
Start: 2022-11-15 | End: 2023-01-06

## 2022-11-01 RX ORDER — HYDROXYZINE HYDROCHLORIDE 25 MG/1
25-50 TABLET, FILM COATED ORAL 3 TIMES DAILY PRN
Qty: 120 TABLET | Refills: 0 | Status: SHIPPED | OUTPATIENT
Start: 2022-11-01 | End: 2022-11-25

## 2022-11-01 NOTE — PATIENT INSTRUCTIONS
Treatment Plan:  Continue gabapentin 900 mg 3 times daily for anxiety and alcohol cravings.  Increase Viibryd/vilazodone for anxiety and PTSD: Can take 15 mg daily for 1-2 weeks and then increase to 20 mg daily as tolerated.  10 mg daily for anxiety, PTSD.    Start hydroxyzine 25-50 mg up to three times a day as needed for anxiety/sleep.   Continue all other cares per primary care provider.   Continue all other medications as reviewed per electronic medical record today.   Safety plan reviewed. To the Emergency Department as needed or call after hours crisis line at [phone] or [phone]. [place] Crisis Text Line. Text [place] to 488937 or Suicide LifeLine Chat: suicidepreventionlifeline.org/chat  Consider individual psychotherapy for additional support and ongoing development of nonpharmacologic coping skills and strategies.  Schedule an appointment with me in 4 weeks or sooner as needed. Call [facility] at [phone] to schedule.  Follow up with primary care provider as planned or for acute medical concerns.  Call the psychiatric nurse line with medication questions or concerns at [phone].  [employer] may be used to communicate with your provider, but this is not intended to be used for emergencies.

## 2022-11-01 NOTE — PROGRESS NOTES
"Telemedicine Visit: The patient's condition can be safely assessed and treated via synchronous audio and visual telemedicine encounter.      Reason for Telemedicine Visit: Patient has requested telehealth visit    Originating Site (Patient Location): Patient's home    Distant Location (provider location):  Off-site    Consent:  The patient/guardian has verbally consented to: the potential risks and benefits of telemedicine (video visit) versus in person care; bill my insurance or make self-payment for services provided; and responsibility for payment of non-covered services.     Mode of Communication:  Video Conference via Pica[employer]    As the provider I attest to compliance with applicable laws and regulations related to telemedicine.        Outpatient Psychiatric Progress Note    Name: Jun NOLEN John Dey   : 1970                    Primary Care Provider: Physician No Ref-Primary   Therapist: None     PHQ-9 scores:  PHQ-9 SCORE 2022   PHQ-9 Total Score MyChart 10 (Moderate depression) - 16 (Moderately severe depression)   PHQ-9 Total Score 10 11 16       JULIENNE-7 scores:  JULIENNE-7 SCORE 2022 10/3/2022   Total Score 21 (severe anxiety) 17 (severe anxiety)   Total Score 21 17       Patient Identification:  Patient is a 52 year old, partnered / significant other  White  or  male  who presents for return visit with me.  Patient is currently employed full time. Patient attended the phone/video session alone. Patient prefers to be called: \"Jun\".    Interim History:  I last saw Jun Glez for outpatient psychiatry return vist on 10/4/2022. During that appointment, we:      Continue gabapentin 900 mg 3 times daily for anxiety and alcohol cravings.    Decrease clonidine: After stable on Viibryd 10 mg daily decrease a.m. clonidine dose to 0.05 mg for about 3-5 days and then discontinue a.m. clonidine dose.  Can continue with 0.2 mg bedtime dose until follow-up visit. " "    Start Viibryd/vilazodone 10 mg daily for anxiety, PTSD.  Can message me in a couple weeks if tolerating well to consider increasing dose to 20 mg daily.    Continue all other cares per primary care provider.     11/1: Patient doing relatively okay.  Has cut back his alcohol use successfully and still working to cut back further.  Is no longer taking clonidine and was able to successfully taper.  Patient has been having some GI upset/loose stools with starting Viibryd.  Does report it seems to be getting better over time/continued use.  Does not find the symptoms distressing at this time.  Willing to continue to titration.  No acute safety concerns.  No SI.  No problematic drug or alcohol use.    Had recent knee surgery for meniscal tear.  Healing well.  Goes back to work maybe after the 10th with some restrictions. Received some hydroxyzine for pain adjunct and has found it really helps with anxiety. Was taking 25 mg every 4-6 hrs as needed. Sleep not that bad - \"satus quo.\"     Per [facility] [place], Dr. Alejo Cervantes, during today's team-based visit:  Reported that he had a knee surgery and has been out of work for about 3 weeks and recovering well. He did not increase the Viibryd because of side effects (GI side effects).  He is completely off clonidine now for about 2 weeks now. He is not noticing a difference with the gabapentin. He noted that his alcohol use is much less and continues to be sporadic but not excessive (reduced by at least half since last appointment). Sleep tends to be ok. He still wakes up and has difficulty falling back to sleep. He still has nightmares and they are still quite graphic and still 3-4 times per week. He would like to discuss possibly increasing Viibryd but needs clarity about how to know if Viibryd or the gabapentin is working. Following his knee surgery, he was prescribed Vistaril and has found that it has helped his anxiety and would like to discuss if he can use it in the future.     Past " "Psychiatric Med Trials:  Psych Meds at Intake:  Buspar - pt reported not taking   Klonopin - rare prn     Past Psych Meds:  escitalopram   venlafaxine  buspar - up to 45 mg daily and felt dizzy  clonazepam  Likely others he cannot remember    Psychiatric ROS:  Jun L John Dey reports mood has been: Improving  Anxiety has been: Improving, has been a little better while off work  Sleep has been: \"Status Quo\"  Doris sxs: None  Psychosis sxs: None  ADHD/ADD sxs: N/A  PTSD sxs: See HPI above  PHQ9 and GAD7 scores were reviewed today if completed.   Medication side effects: Some stomach upset and loose stools from Viibryd  Current stressors include: Symptoms and See HPI above  Coping mechanisms and supports include: Family, Hobbies and Friends    Current medications include:   Current Outpatient Medications   Medication Sig     gabapentin (NEURONTIN) 300 MG capsule Take 3 capsules (900 mg) by mouth 3 times daily     magnesium oxide (MAG-OX) 400 MG tablet Take 400 mg by mouth daily     vilazodone (VIIBRYD) 10 MG TABS tablet Take 1 tablet (10 mg) by mouth daily     vitamin B complex with vitamin C (STRESS TAB) tablet Take 1 tablet by mouth daily     No current facility-administered medications for this visit.       The [place] Prescription Monitoring Program has been reviewed and there are no concerns about diversionary activity for controlled substances at this time.   10/28/2022  1   10/28/2022  Oxycodone Hcl (Ir) 5 MG Tablet  15.00  3 Na Hay   [id number]   [place] ([id number][place]   0/0  37.50 MME  Comm Ins   [place]   10/24/2022  1   09/13/2022  Gabapentin 300 MG Capsule  270.00  30 Al Bau   [id number]   [place] ([id number][place]   1/1   [place]   09/14/2022  1   09/13/2022  Gabapentin 300 MG Capsule  270.00  30 Al Bau   [id number]   [place] ([id number][place]   0/1   [place]   08/29/2022  1   08/02/2022  Gabapentin 300 MG Capsule  120.00  30 Al Bau   [id number]   [place] ([id number])   1/1   [place]       Past Medical/Surgical History:  Past Medical History:   Diagnosis " Date     Acute appendicitis with peritoneal abscess 07/09/2013     Hypertension       has a past medical history of Acute appendicitis with peritoneal abscess (07/09/2013) and Hypertension ().    He has no past medical history of Arthritis, Cancer (H), Cerebral infarction (H), Congestive heart failure (H), COPD (chronic obstructive pulmonary disease) (H), Depressive disorder, Diabetes (H), Heart disease, History of blood transfusion, Thyroid disease, or Uncomplicated asthma.    Social History:  Reviewed. No changes to social history except as noted above in HPI.    Vital Signs:   None. This is phone/video visit.     Labs:  Most recent laboratory results reviewed and no new labs.     Review of Systems:  10 systems (general, cardiovascular, respiratory, eyes, ENT, endocrine, GI, , M/S, neurological) were reviewed. Most pertinent finding(s) is/are: denies fever, cough, headaches, shortness of breath, chest pain, N/V, constipation/diarrhea, trouble urinating, aches and pains.  Does have some knee pain related to recent surgery.  The remaining systems are all unremarkable.    Mental Status Examination (limited as this is by phone/video):  Appearance: Awake, alert, appears stated age, no acute distress, well-groomed   Attitude:  cooperative, pleasant   Motor: No gross abnormalities observed via video, not formally tested   Oriented to:  person, place, time, and situation  Attention Span and Concentration:  normal  Speech:  clear, coherent, regular rate, rhythm, and volume  Language: intact  Mood:  anxious  Affect:  appropriate and in normal range and mood congruent  Associations:  no loose associations  Thought Process:  logical, linear and goal oriented  Thought Content:  no evidence of suicidal ideation or homicidal ideation, no evidence of psychotic thought, no auditory hallucinations present and no visual hallucinations present  Recent and Remote Memory:  Intact to interview. Not formally assessed. No  amnesia.  Fund of Knowledge: appropriate  Insight:  good  Judgment:  intact, adequate for safety  Impulse Control:  intact    Suicide Risk Assessment:  Today Jun Glez reports no suicidal ideation. Based on all available evidence including the factors cited above, Jun Glez does not appear to be at imminent risk for self-harm, does not meet criteria for a 72-hr hold, and therefore remains appropriate for ongoing outpatient level of care.  A thorough assessment of risk factors related to suicide and self-harm have been reviewed and are noted above. The patient convincingly denies suicidality on several occasions. Local community safety resources reviewed for patient to use if needed. There was no deceit detected, and the patient presented in a manner that was believable.     DSM5 Diagnosis:  Generalized anxiety disorder  PTSD  Alcohol dependence, uncomplicated in remission    Medical comorbidities include:   Patient Active Problem List    Diagnosis Date Noted     S/P laparoscopic appendectomy 06/25/2013     Priority: Medium       Psychosocial & Contextual Factors: see HPI above    Assessment:  From Intake, 8/2/2022:  Jun Glez is a 52-year-old male with past psychiatric history including PTSD and anxiety who presents today for psychiatric evaluation.  Patient has a history of working with psychiatric outpatient providers and also primary care provider regarding mental health symptoms.  Through questioning, it became quite apparent that the patient's difficult to treat anxiety symptoms are likely related to alcohol withdrawal he is experiencing on a near daily basis.  Patient reports feeling fine most mornings into late morning, very early afternoon but then by around noon time he will start to feel his blood pressure increased, pulse increase, will have increased sweating, sometimes stomach upset, and feeling of doom and significantly increased anxiety.  These are all symptoms of alcohol  withdrawal.  Patient was educated on the symptoms and the pattern of alcohol withdrawal in relation to when he is drinking as 5-7 drinks every night.  This likely is becoming more of a thing the longer he has been drinking excessively and also ever since he switched to hard liquor 2-3 years ago (which correlates when the symptoms began occurring).  Patient was encouraged to decrease his alcohol use.  He declined the need for any chemical dependency resources or supports at this time.  If he cannot cut back on his own he is agreeable to reaching out to us for additional chemical dependency supports and resources for treatment or detox.  He is aware of the available resources.  At this time, he is agreeable to prescriptions for clonidine and gabapentin to treat his withdrawal symptoms and to support his attempts in cutting back his alcohol use.  Discussed off label use for these medications for this purpose and also risks and benefits of these medications.  We also reviewed the risks of using clonazepam with alcohol and patient denies that he ever uses clonazepam with alcohol.  Individual psychotherapy strongly recommended.  No acute safety concerns or SI.  Patient will be seen back for follow-up.    9/13/2022:  Patient overall with some improvement of symptoms, particularly initially.  Initial improvement unfortunately did not last very long.  Patient was able to decrease his alcohol use somewhat significantly.  Still trying to decrease use further but still struggling some to eliminate completely.  Admits to using alcohol to self medicate.  Unclear still at this time what symptoms might be more related to anxiety and PTSD versus more subtle withdrawal.  Patient did not notice anything at all on lower dose of clonidine and so I do recommend titrating dose a little higher since I think decreased adrenergic drive will help quite significantly.  I think that will also help with nightmares and sleep.  Also discussed  potentially adding mirtazapine at bedtime.  I would like to see him cut back a little more on alcohol use.  We will titrate gabapentin dose a little higher since it was quite helpful initially when first started.  Patient agrees to message me if he would like to change his dose of his medications.  No acute safety concerns.  No SI.  No drug use.    10/4/2022:  Patient overall doing a little better.  Has significantly cut back on alcohol use.  Gabapentin continues to be helpful.  Not so sure about clonidine.  We will wean off a.m. dose since causing some sedation and limited efficacy.  Since ongoing anxiety and PTSD related symptoms, we will start Viibryd.  Patient has had Gene[employer] testing and Viibryd metabolized normally.  No acute safety concerns.  No SI.  No problematic drug or alcohol use.  Individual psychotherapy encouraged.    11/01/2022:  Patient overall doing relatively okay.  Some decreased stress while off work.  Having some side effects from Viibryd but thinks they are getting better over time.  Some stomach upset and loose stools.  Not too distressing.  Off clonidine without decompensation of symptoms.  Willing to continue Viibryd titration.  No acute safety concerns.  No SI.  Denies problematic drug or alcohol use.  Continues to try to cut back on his alcohol use but denies that it is causing any current problems.  Individual psychotherapy has been encouraged.    Medication side effects and alternatives were reviewed. Health promotion activities recommended and reviewed today. All questions addressed. Education and counseling completed regarding risks and benefits of medications and psychotherapy options. Recommend therapy for additional support.     Treatment Plan:    Continue gabapentin 900 mg 3 times daily for anxiety and alcohol cravings.    Increase Viibryd/vilazodone for anxiety and PTSD: Can take 15 mg daily for 1-2 weeks and then increase to 20 mg daily as tolerated.  10 mg daily for anxiety,  PTSD.      Start hydroxyzine 25-50 mg up to three times a day as needed for anxiety/sleep.     Continue all other cares per primary care provider.     Continue all other medications as reviewed per electronic medical record today.     Safety plan reviewed. To the Emergency Department as needed or call after hours crisis line at [phone] or [phone]. [place] Crisis Text Line. Text [place] to 432968 or Suicide LifeLine Chat: suicidepreventionYieldPlanetline.org/chat    Consider individual psychotherapy for additional support and ongoing development of nonpharmacologic coping skills and strategies.    Schedule an appointment with me in 4 weeks or sooner as needed. Call [facility] at [phone] to schedule.    Follow up with primary care provider as planned or for acute medical concerns.    Call the psychiatric nurse line with medication questions or concerns at [phone].    [employer] may be used to communicate with your provider, but this is not intended to be used for emergencies.    Administrative Billing:   Phone Call/Video Duration: 17 Minutes  Start: 11:33a  Stop: 11:50a      Patient Status:  Patient will continue to be seen for ongoing consultation and stabilization.    Signed:   Leigh Ann Elam DO  [facility]    Disclaimer: This note consists of symbols derived from keyboarding, dictation and/or voice recognition software. As a result, there may be errors in the script that have gone undetected. Please consider this when interpreting information found in this chart.

## 2022-11-01 NOTE — Clinical Note
Please call this patient to get them scheduled for a follow-up visit in 4 weeks. Please schedule with me and the [facility]. Thanks!

## 2022-11-01 NOTE — PROGRESS NOTES
[facility] Psychiatry Services [facility]  November 1, 2022      Behavioral Health Clinician Progress Note    Patient Name: Jun Glez           Service Type:  Individual      Service Location:   [place] / Email (patient reached)     Session Start Time: 11:00 am  Session End Time: 11:25 am      Session Length: 16 - 37      Attendees: Patient     Service Modality:  Video Visit:      Provider verified identity through the following two step process.  Patient provided:  Patient is known previously to provider    Telemedicine Visit: The patient's condition can be safely assessed and treated via synchronous audio and visual telemedicine encounter.      Reason for Telemedicine Visit: Services only offered telehealth    Originating Site (Patient Location): Patient's home    Distant Site (Provider Location): Provider Remote Setting- Home Office    Consent:  The patient/guardian has verbally consented to: the potential risks and benefits of telemedicine (video visit) versus in person care; bill my insurance or make self-payment for services provided; and responsibility for payment of non-covered services.     Patient would like the video invitation sent by:  My Chart    Mode of Communication:  Video Conference via [facility]    As the provider I attest to compliance with applicable laws and regulations related to telemedicine.    Visit Activities (Refresh list every visit): [facility] Emergency Department Only    Diagnostic Assessment Date: 08/02/2022  Treatment Plan Review Date: 12/13/2022  See Flowsheets for today's PHQ-9 and JULIENNE-7 results  Previous PHQ-9:   PHQ-9 SCORE 6/8/2022 7/11/2022 9/13/2022   PHQ-9 Total Score MyChart 10 (Moderate depression) - 16 (Moderately severe depression)   PHQ-9 Total Score 10 11 16     Previous JULIENNE-7:   JULIENNE-7 SCORE 7/4/2022 10/3/2022   Total Score 21 (severe anxiety) 17 (severe anxiety)   Total Score 21 17       ISABELL LEVEL:  No flowsheet data found.    DATA  Extended Session (60+ minutes):  No  Interactive Complexity: No  Crisis: No  [facility] Patient: No    Treatment Objective(s) Addressed in This Session:  Target Behavior(s): alcohol use and anxiety    Anxiety: will experience a reduction in anxiety and will develop more effective coping skills to manage anxiety symptoms  Alcohol / Substance Use: will increase understanding of the effects of substance use  will make healthier choices in regard to substance use  PTSD Symptom Management      Current Stressors / Issues:  Reported that he had a knee surgery and has been out of work for about 3 weeks and recovering well. He did not increase the Viibryd because of side effects (GI side effects).  He is completely off clonidine now for about 2 weeks now. He is not noticing a difference with the gabapentin. He noted that his alcohol use is much less and continues to be sporadic but not excessive (reduced by at least half since last appointment). Sleep tends to be ok. He still wakes up and has difficulty falling back to sleep. He still has nightmares and they are still quite graphic and still 3-4 times per week. He would like to discuss possibly increasing Viibryd but needs clarity about how to know if Viibryd or the gabapentin is working. Following his knee surgery, he was prescribed Vistaril and has found that it has helped his anxiety and would like to discuss if he can use it in the future.      10/04/2022:  Reported that gabapentin seems to have worked to some extent. He notices that he is a little more relaxed. He has only been using 1 clonidine in the morning because it was too sedating. He finds that his nightmares are more graphic since he stopped drinking. He has chosen to cut back on alcohol and has not had anything to drink in almost 2 weeks. He spoke about his search for a therapist and is awaiting a call back from some places.      09/13/2022:  Reported that gabapentin was working very well for him. However, it eventually it stopped working. When it  "was working, he was not having anxiety attacks during the day. \"It was amazing.\" However, the effects wore off after about 3 weeks. Now he notices that the anxiety lasts all day. However, clonidine did not make any difference for him which was consistent with his reported history. He cut back on his alcohol use to about half his prior amount. The nightmares happen every night though they do not always wake him up; they are vivid and thematically related to experiences he has had at work. He did not refill the clonidine and is open to starting it again if it will help with the anxiety. He continues to take magnesium and B-complex. We discussed therapy options. He reviewed recommendations offered last appointment and would like to work with an ART provider. Will work with him to find a therapist.      08/02/2022 (EVELIO):  Reported that a few years ago he was having chest pain and thought he was having blood pressure problems. 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 Spoke with his colleague and was told it was panic attacks. He saw someone at [facility] but prefers to stay with [place]. [facility] tried a few medications and was not going well. Did [employer] testing and had no help. Most recently was on Buspar and had side-effects and stopped it abruptly. He has tried 3 medications (escitalopram, venlaflaxine, buspar, and clonazepam) but they have not helped so much. He has started using magnesium and and vitamin B complex which has improved his sleep but still has nightmares. He has been diagnosed with JULIENNE and eventually PTSD. \"Right now I'm just dealing with it.\" He exercises 3-4x a week which helps with stress to some extent. He is having panic symptoms for 7-10 hours at a time. He does not feel like he is having panic attacks but is certainly having high anxiety and concerned with how his blood pressure increases. Had been working with a therapist at [facility] who suggested " "EMDR but he watched something on YouTube about it but it did not seem like something that would be helpful for him. Spoke about having anxiety about anticipating when his pager will go off and \"what will we see next?\" This is has been starting to happen more even on his days off which is new. He has decreased his hobbies the last few months which is also unusual for him.      Progress on Treatment Objective(s) / Homework:  Minimal progress - PREPARATION (Decided to change - considering how); Intervened by negotiating a change plan and determining options / strategies for behavior change, identifying triggers, exploring social supports, and working towards setting a date to begin behavior change    Also provided psychoeducation about behavioral health condition, symptoms, and treatment options    Care Plan review completed: Yes    Medication Review:  Changes to psychiatric medications, see updated Medication List in EPIC.     Medication Compliance:  Yes      Changes in Health Issues:   None reported    Chemical Use Review:   Substance Use: decrease in alcohol .  Patient reports frequency of use half as much from intake.  Stage of Change: Preparatory        Tobacco Use: No current tobacco use.      Assessment: Current Emotional / Mental Status (status of significant symptoms):  Risk status (Self / Other harm or suicidal ideation)  Patient denies a history of suicidal ideation, suicide attempts, self-injurious behavior, homicidal ideation, homicidal behavior and and other safety concerns  Patient denies current fears or concerns for personal safety.  Patient denies current or recent suicidal ideation or behaviors.  Patient denies current or recent homicidal ideation or behaviors.  Patient denies current or recent self injurious behavior or ideation.  Patient denies other safety concerns.  A safety and risk management plan has not been developed at this time, however patient was encouraged to call [facility] / 91 " should there be a change in any of these risk factors.    Appearance:   Appropriate   Eye Contact:   Good   Psychomotor Behavior: Normal   Attitude:   Cooperative   Orientation:   All  Speech   Rate / Production: Normal    Volume:  Normal   Mood:    Anxious   Affect:    Appropriate   Thought Content:  Clear   Thought Form:  Coherent  Logical   Insight:    Good     Diagnoses:  1. JULIENNE (generalized anxiety disorder)    2. PTSD (post-traumatic stress disorder)    3. Uncomplicated alcohol dependence (H)        Collateral Reports Completed:  Communicated with: Dr. Elam    Plan: (Homework, other):  Patient was given information about behavioral services and encouraged to schedule a follow up appointment with the clinic [facility] in conjunction with next [facility] appointment.  He was also given information about mental health symptoms and treatment options .  CD Recommendations: No indications of CD issues.  Alejo Cervantes PsyD, LP      ______________________________________________________________________    [facility] Psychiatry Services [facility]: Treatment Plan    Patient's Name: Jun Glez  YOB: 1970    Date of Creation: September 13, 2022  Date Treatment Plan Last Reviewed/Revised: September 13, 2022    DSM5 Diagnoses: 300.02 (F41.1) Generalized Anxiety Disorder, 309.81 (F43.10) Posttraumatic Stress Disorder (includes Posttraumatic Stress Disorder for Children 6 Years and Younger)  Without dissociative symptoms or Substance-Related & Addictive Disorders Alcohol Use Disorder   303.90 (F10.20) Moderate    Psychosocial / Contextual Factors: occupational stressors  PROMIS (reviewed every 90 days):   PROMIS 10-Global Health (only subscores and total score):   PROMIS-10 Scores Only 7/4/2022 10/3/2022   Global Mental Health Score 8 9   Global Physical Health Score 14 16   PROMIS TOTAL - SUBSCORES 22 25       Referral / Collaboration:  Referral to another professional/service is not  indicated at this time..    Anticipated number of session for this episode of care: 5-6  Anticipation frequency of session: As determined by Dr. Elam  Anticipated Duration of each session: 16-37 minutes  Treatment plan will be reviewed in 90 days or when goals have been changed.       MeasurableTreatment Goal(s) related to diagnosis / functional impairment(s)  Goal 1: Patient will work with providers to manage symptoms    I will know I've met my goal when I stop feeling so anxious.      Objective #A (Patient Action)  Patient will attend all appointments, take medication as prescribed.  Status: New - Date: 09/13/2022     Intervention(s)  [facility] will Monitor and assist in overcoming barriers to treatment adherence    Objective #B  Patient will consider all recommendations offered.  Status: New - Date: 09/13/2022      Intervention(s)  [facility] will educate patient on treatment options, clarify concerns, work with pt to overcome any resistance to compliance.      Patient has reviewed and agreed to the above plan.      Jaun Cervantes PsyD  11/01/2022

## 2022-11-10 ENCOUNTER — TRANSFERRED RECORDS (OUTPATIENT)
Dept: HEALTH INFORMATION MANAGEMENT | Facility: CLINIC | Age: 52
End: 2022-11-10

## 2022-11-14 ENCOUNTER — HOSPITAL ENCOUNTER (OUTPATIENT)
Dept: ULTRASOUND IMAGING | Facility: CLINIC | Age: 52
Discharge: HOME | End: 2022-11-14
Attending: ORTHOPAEDIC SURGERY | Admitting: ORTHOPAEDIC SURGERY
Payer: COMMERCIAL

## 2022-11-14 DIAGNOSIS — M79.89 PAIN AND SWELLING OF LOWER EXTREMITY: ICD-10-CM

## 2022-11-14 DIAGNOSIS — M79.606 PAIN AND SWELLING OF LOWER EXTREMITY: ICD-10-CM

## 2022-11-14 PROCEDURE — 93971 EXTREMITY STUDY: CPT | Mod: LT

## 2022-11-25 DIAGNOSIS — F41.1 GAD (GENERALIZED ANXIETY DISORDER): ICD-10-CM

## 2022-11-25 RX ORDER — HYDROXYZINE HYDROCHLORIDE 25 MG/1
25-50 TABLET, FILM COATED ORAL 3 TIMES DAILY PRN
Qty: 120 TABLET | Refills: 0 | Status: SHIPPED | OUTPATIENT
Start: 2022-11-25 | End: 2023-03-28

## 2022-11-25 NOTE — TELEPHONE ENCOUNTER
Date of Last Office Visit: 11/1/22  Date of Next Office Visit: 11/29/22  No shows since last visit: 0  Cancellations since last visit: 0    Medication requested:hydrOXYzine (ATARAX) 25 MG tablet  Date last ordered: 11/1/22 Qty: 120 Refills: 0       Review of [place] ?: NA      Lapse in medication adherence greater than 5 days?: No  If yes, call patient and gather details: NA  Medication refill request verified as identical to current order?: yes  Result of Last DAM, VPA, Li+ Level, CBC, or Carbamazepine Level (at or since last visit): N/A    Last visit treatment plan:     Treatment Plan:    Continue gabapentin 900 mg 3 times daily for anxiety and alcohol cravings.    Increase Viibryd/vilazodone for anxiety and PTSD: Can take 15 mg daily for 1-2 weeks and then increase to 20 mg daily as tolerated.  10 mg daily for anxiety, PTSD.      Start hydroxyzine 25-50 mg up to three times a day as needed for anxiety/sleep.     Continue all other cares per primary care provider.     Continue all other medications as reviewed per electronic medical record today.     Safety plan reviewed. To the Emergency Department as needed or call after hours crisis line at [phone] or [phone]. [place] Crisis Text Line. Text [place] to 428320 or Suicide LifeLine Chat: suicidepreventionlifeline.org/chat    Consider individual psychotherapy for additional support and ongoing development of nonpharmacologic coping skills and strategies.    Schedule an appointment with me in 4 weeks or sooner as needed. Call [facility] at [phone] to schedule.    Follow up with primary care provider as planned or for acute medical concerns.    Call the psychiatric nurse line with medication questions or concerns at [phone].    MyChart may be used to communicate with your provider, but this is not intended to be used for emergencies.       []Medication refilled per  Medication Refill in Ambulatory Care  policy.  [x]Medication unable to  be refilled by RN due to criteria not met as indicated below:    []Eligibility - not seen in the last year   []Supervision - no future appointment   []Compliance - no shows, cancellations or lapse in therapy   []Verification - order discrepancy   []Controlled medication   [x]Medication not included in policy   []90-day supply request   [x]Other: LPN is processing request

## 2022-11-29 ENCOUNTER — VIRTUAL VISIT (OUTPATIENT)
Dept: BEHAVIORAL HEALTH | Facility: CLINIC | Age: 52
End: 2022-11-29
Payer: COMMERCIAL

## 2022-11-29 ENCOUNTER — VIRTUAL VISIT (OUTPATIENT)
Dept: PSYCHIATRY | Facility: CLINIC | Age: 52
End: 2022-11-29
Payer: COMMERCIAL

## 2022-11-29 DIAGNOSIS — F41.1 GAD (GENERALIZED ANXIETY DISORDER): Primary | ICD-10-CM

## 2022-11-29 DIAGNOSIS — F10.21 ALCOHOL DEPENDENCE IN REMISSION (H): ICD-10-CM

## 2022-11-29 DIAGNOSIS — F43.10 PTSD (POST-TRAUMATIC STRESS DISORDER): ICD-10-CM

## 2022-11-29 DIAGNOSIS — F10.20 UNCOMPLICATED ALCOHOL DEPENDENCE (H): ICD-10-CM

## 2022-11-29 PROCEDURE — 90832 PSYTX W PT 30 MINUTES: CPT | Mod: 95 | Performed by: PSYCHOLOGIST

## 2022-11-29 PROCEDURE — 99214 OFFICE O/P EST MOD 30 MIN: CPT | Mod: 95 | Performed by: PSYCHIATRY & NEUROLOGY

## 2022-11-29 NOTE — PROGRESS NOTES
[facility]  November 29, 2022      Behavioral Health Clinician Progress Note    Patient Name: Jun Glez           Service Type:  Individual      Service Location:   [employer] / Email (patient reached)     Session Start Time: 09:30 am  Session End Time: 09:50 am      Session Length: 16 - 37      Attendees: Patient     Service Modality:  Video Visit:      Provider verified identity through the following two step process.  Patient provided:  Patient is known previously to provider    Telemedicine Visit: The patient's condition can be safely assessed and treated via synchronous audio and visual telemedicine encounter.      Reason for Telemedicine Visit: Services only offered telehealth    Originating Site (Patient Location): Patient's home    Distant Site (Provider Location): Provider Remote Setting- Home Office    Consent:  The patient/guardian has verbally consented to: the potential risks and benefits of telemedicine (video visit) versus in person care; bill my insurance or make self-payment for services provided; and responsibility for payment of non-covered services.     Patient would like the video invitation sent by:  My Chart    Mode of Communication:  Video Conference via [facility]    As the provider I attest to compliance with applicable laws and regulations related to telemedicine.    Visit Activities (Refresh list every visit): [facility] [place] Only    Diagnostic Assessment Date: 08/02/2022  Treatment Plan Review Date: 12/13/2022  See Flowsheets for today's PHQ-9 and JULIENNE-7 results  Previous PHQ-9:   PHQ-9 SCORE 6/8/2022 7/11/2022 9/13/2022   PHQ-9 Total Score MyChart 10 (Moderate depression) - 16 (Moderately severe depression)   PHQ-9 Total Score 10 11 16     Previous JULIENNE-7:   JULIENNE-7 SCORE 7/4/2022 10/3/2022   Total Score 21 (severe anxiety) 17 (severe anxiety)   Total Score 21 17       ISABELL LEVEL:  No flowsheet data found.    DATA  Extended Session (60+ minutes):  "No  Interactive Complexity: No  Crisis: No  [facility] Patient: No    Treatment Objective(s) Addressed in This Session:  Target Behavior(s): alcohol use and anxiety    Anxiety: will experience a reduction in anxiety and will develop more effective coping skills to manage anxiety symptoms  Alcohol / Substance Use: will increase understanding of the effects of substance use  will make healthier choices in regard to substance use  PTSD Symptom Management      Current Stressors / Issues:  Reported that he is still on light duty because of his knee but is doing well overall. He noted that he has had fewer stressors lately because he has been recovering. He would like to reduce the gabapentin to 900 mg in the morning and feels the Viibryd with hydroxyzine are working well. He remains essentially sober from alcohol. The dreams are more intense. She said that he reached out to some of the therapists with no call backs yet. He noted that he has only had 4-5 intense anxiety problems but they do not last as long. He still has some GI side effects but he thinks it is getting better. \"It's tolerable and it's worth it.\"       11/01/2022:  Reported that he had a knee surgery and has been out of work for about 3 weeks and recovering well. He did not increase the Viibryd because of side effects (GI side effects).  He is completely off clonidine now for about 2 weeks now. He is not noticing a difference with the gabapentin. He noted that his alcohol use is much less and continues to be sporadic but not excessive (reduced by at least half since last appointment). Sleep tends to be ok. He still wakes up and has difficulty falling back to sleep. He still has nightmares and they are still quite graphic and still 3-4 times per week. He would like to discuss possibly increasing Viibryd but needs clarity about how to know if Viibryd or the gabapentin is working. Following his knee surgery, he was prescribed Vistaril and has found that it has " "helped his anxiety and would like to discuss if he can use it in the future.      10/04/2022:  Reported that gabapentin seems to have worked to some extent. He notices that he is a little more relaxed. He has only been using 1 clonidine in the morning because it was too sedating. He finds that his nightmares are more graphic since he stopped drinking. He has chosen to cut back on alcohol and has not had anything to drink in almost 2 weeks. He spoke about his search for a therapist and is awaiting a call back from some places.      09/13/2022:  Reported that gabapentin was working very well for him. However, it eventually it stopped working. When it was working, he was not having anxiety attacks during the day. \"It was amazing.\" However, the effects wore off after about 3 weeks. Now he notices that the anxiety lasts all day. However, clonidine did not make any difference for him which was consistent with his reported history. He cut back on his alcohol use to about half his prior amount. The nightmares happen every night though they do not always wake him up; they are vivid and thematically related to experiences he has had at work. He did not refill the clonidine and is open to starting it again if it will help with the anxiety. He continues to take magnesium and B-complex. We discussed therapy options. He reviewed recommendations offered last appointment and would like to work with an ART provider. Will work with him to find a therapist.      08/02/2022 (EVELIO):  Reported that a few years ago he was having chest pain and thought he was having blood pressure problems. 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 Spoke with his colleague and was told it was panic attacks. He saw someone at [facility] but prefers to stay with [place]. [facility] tried a few medications and was not going well. Did GeneSight testing and had no help. Most recently was on Buspar and had side-effects " "and stopped it abruptly. He has tried 3 medications (escitalopram, venlaflaxine, buspar, and clonazepam) but they have not helped so much. He has started using magnesium and and vitamin B complex which has improved his sleep but still has nightmares. He has been diagnosed with JULIENNE and eventually PTSD. \"Right now I'm just dealing with it.\" He exercises 3-4x a week which helps with stress to some extent. He is having panic symptoms for 7-10 hours at a time. He does not feel like he is having panic attacks but is certainly having high anxiety and concerned with how his blood pressure increases. Had been working with a therapist at [facility] who suggested EMDR but he watched something on YouTube about it but it did not seem like something that would be helpful for him. Spoke about having anxiety about anticipating when his pager will go off and \"what will we see next?\" This is has been starting to happen more even on his days off which is new. He has decreased his hobbies the last few months which is also unusual for him.      Progress on Treatment Objective(s) / Homework:  Satisfactory progress - PREPARATION (Decided to change - considering how); Intervened by negotiating a change plan and determining options / strategies for behavior change, identifying triggers, exploring social supports, and working towards setting a date to begin behavior change    Also provided psychoeducation about behavioral health condition, symptoms, and treatment options    Care Plan review completed: Yes    Medication Review:  Changes to psychiatric medications, see updated Medication List in EPIC.     Medication Compliance:  Yes      Changes in Health Issues:   None reported    Chemical Use Review:   Substance Use: decrease in alcohol .  Patient reports frequency of use half as much from intake.  Stage of Change: Preparatory        Tobacco Use: No current tobacco use.      Assessment: Current Emotional / Mental Status (status of significant " symptoms):  Risk status (Self / Other harm or suicidal ideation)  Patient denies a history of suicidal ideation, suicide attempts, self-injurious behavior, homicidal ideation, homicidal behavior and and other safety concerns  Patient denies current fears or concerns for personal safety.  Patient denies current or recent suicidal ideation or behaviors.  Patient denies current or recent homicidal ideation or behaviors.  Patient denies current or recent self injurious behavior or ideation.  Patient denies other safety concerns.  A safety and risk management plan has not been developed at this time, however patient was encouraged to call [place] [phone] should there be a change in any of these risk factors.    Appearance:   Appropriate   Eye Contact:   Good   Psychomotor Behavior: Normal   Attitude:   Cooperative   Orientation:   All  Speech   Rate / Production: Normal    Volume:  Normal   Mood:    Anxious   Affect:    Appropriate   Thought Content:  Clear   Thought Form:  Coherent  Logical   Insight:    Good     Diagnoses:  1. JULIENNE (generalized anxiety disorder)    2. PTSD (post-traumatic stress disorder)    3. Uncomplicated alcohol dependence (H)        Collateral Reports Completed:  Communicated with: Dr. Elam    Plan: (Homework, other):  Patient was given information about behavioral services and encouraged to schedule a follow up appointment with [facility]  He was also given information about mental health symptoms and treatment options .  CD Recommendations: Practice Harm Reduction: Alcohol.  Alejo Cervantes PsyD, LP      ______________________________________________________________________    [facility]: Treatment Plan    Patient's Name: Jun Glez  YOB: 1970    Date of Creation: September 13, 2022  Date Treatment Plan Last Reviewed/Revised: September 13, 2022    DSM5 Diagnoses: 300.02  (F41.1) Generalized Anxiety Disorder, 309.81 (F43.10) Posttraumatic Stress Disorder (includes Posttraumatic Stress Disorder for Children 6 Years and Younger)  Without dissociative symptoms or Substance-Related & Addictive Disorders Alcohol Use Disorder   303.90 (F10.20) Moderate    Psychosocial / Contextual Factors: occupational stressors  PROMIS (reviewed every 90 days):   PROMIS 10-Global Health (only subscores and total score):   PROMIS-10 Scores Only 7/4/2022 10/3/2022   Global Mental Health Score 8 9   Global Physical Health Score 14 16   PROMIS TOTAL - SUBSCORES 22 25       Referral / Collaboration:  Referral to another professional/service is not indicated at this time..    Anticipated number of session for this episode of care: 5-6  Anticipation frequency of session: As determined by Dr. Elam  Anticipated Duration of each session: 16-37 minutes  Treatment plan will be reviewed in 90 days or when goals have been changed.       MeasurableTreatment Goal(s) related to diagnosis / functional impairment(s)  Goal 1: Patient will work with providers to manage symptoms    I will know I've met my goal when I stop feeling so anxious.      Objective #A (Patient Action)  Patient will attend all appointments, take medication as prescribed.  Status: New - Date: 09/13/2022     Intervention(s)  [facility] will Monitor and assist in overcoming barriers to treatment adherence    Objective #B  Patient will consider all recommendations offered.  Status: New - Date: 09/13/2022      Intervention(s)  [facility] will educate patient on treatment options, clarify concerns, work with pt to overcome any resistance to compliance.      Patient has reviewed and agreed to the above plan.      Jaun Cervantes PsyD  11/29/2022

## 2022-11-29 NOTE — PROGRESS NOTES
"Telemedicine Visit: The patient's condition can be safely assessed and treated via synchronous audio and visual telemedicine encounter.      Reason for Telemedicine Visit: Patient has requested telehealth visit    Originating Site (Patient Location): In his car located in [place].  Car was parked.    Distant Location (provider location):  Off-site    Consent:  The patient/guardian has verbally consented to: the potential risks and benefits of telemedicine (video visit) versus in person care; bill my insurance or make self-payment for services provided; and responsibility for payment of non-covered services.     Mode of Communication:  Video Conference via [employer]    As the provider I attest to compliance with applicable laws and regulations related to telemedicine.        Outpatient Psychiatric Progress Note    Name: Jun NOLEN John Dey   : 1970                    Primary Care Provider: Physician No Ref-Primary   Therapist: None     PHQ-9 scores:  PHQ-9 SCORE 2022   PHQ-9 Total Score MyChart 10 (Moderate depression) - 16 (Moderately severe depression)   PHQ-9 Total Score 10 11 16       JULIENNE-7 scores:  JULIENNE-7 SCORE 2022 10/3/2022   Total Score 21 (severe anxiety) 17 (severe anxiety)   Total Score 21 17       Patient Identification:  Patient is a 52 year old, partnered / significant other  White  or  male  who presents for return visit with me.  Patient is currently employed full time. Patient attended the phone/video session alone. Patient prefers to be called: \"Jun\".    Interim History:  I last saw Jun Glez for outpatient psychiatry return vist on 2022. During that appointment, we:      Continue gabapentin 900 mg 3 times daily for anxiety and alcohol cravings.    Increase Viibryd/vilazodone for anxiety and PTSD: Can take 15 mg daily for 1-2 weeks and then increase to 20 mg daily as tolerated.  10 mg daily for anxiety, PTSD.      Start hydroxyzine " "25-50 mg up to three times a day as needed for anxiety/sleep.     Continue all other cares per primary care provider.     11/29: Patient overall doing fairly well.  Feels like last dose increase of Viibryd very helpful.  Anxiety much more manageable.  Fewer episodes and less intense episodes.  Still having nightmares but not distressing enough to add or change medications in his opinion.  Sleep is overall much better despite ongoing nightmares.  Actively looking for a therapist.  Tolerating medication overall well.  Some stomach upset but not too distressing.  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  No acute safety concerns.  No SI.  Minimal alcohol use.    Per [facility], Dr. Alejo Cervantes, during today's team-based visit:  Reported that he is still on light duty because of his knee but is doing well overall. He noted that he has had fewer stressors lately because he has been recovering. He would like to reduce the gabapentin to 900 mg in the morning and feels the Viibryd with hydroxyzine are working well. He remains essentially sober from alcohol. The dreams are more intense. She said that he reached out to some of the therapists with no call backs yet. He noted that he has only had 4-5 intense anxiety problems but they do not last as long. He still has some GI side effects but he thinks it is getting better. \"It's tolerable and it's worth it.\"      Past Psychiatric Med Trials:  Psych Meds at Intake:  Buspar - pt reported not taking   Klonopin - rare prn     Past Psych Meds:  escitalopram   venlafaxine  buspar - up to 45 mg daily and felt dizzy  clonazepam  Likely others he cannot remember    Psychiatric ROS:  Jun Glez reports mood has been: Improved  Anxiety has been: Improved, much more manageable  Sleep has been: Sleeping a lot better, still having nightmares  Doris sxs: None  Psychosis sxs: None  ADHD/ADD sxs: N/A  PTSD sxs: See HPI above  PHQ9 and GAD7 scores were reviewed today if completed. "   Medication side effects: A little bit of stomach upset but not too distressing and improving  Current stressors include: Symptoms and See HPI above  Coping mechanisms and supports include: Family, Hobbies and Friends    Current medications include:   Current Outpatient Medications   Medication Sig     gabapentin (NEURONTIN) 300 MG capsule Take 3 capsules (900 mg) by mouth 3 times daily     hydrOXYzine (ATARAX) 25 MG tablet Take 1-2 tablets (25-50 mg) by mouth 3 times daily as needed for anxiety     magnesium oxide (MAG-OX) 400 MG tablet Take 400 mg by mouth daily     vilazodone (VIIBRYD) 20 MG TABS tablet Take 1 tablet (20 mg) by mouth daily     vitamin B complex with vitamin C (STRESS TAB) tablet Take 1 tablet by mouth daily     No current facility-administered medications for this visit.       Past Medical/Surgical History:  Past Medical History:   Diagnosis Date     Acute appendicitis with peritoneal abscess 07/09/2013     Hypertension       has a past medical history of Acute appendicitis with peritoneal abscess (07/09/2013) and Hypertension ().    He has no past medical history of Arthritis, Cancer (H), Cerebral infarction (H), Congestive heart failure (H), COPD (chronic obstructive pulmonary disease) (H), Depressive disorder, Diabetes (H), Heart disease, History of blood transfusion, Thyroid disease, or Uncomplicated asthma.    Social History:  Reviewed. No changes to social history except as noted above in HPI.    Vital Signs:   None. This is phone/video visit.     Labs:  Most recent laboratory results reviewed and no new labs.     Review of Systems:  10 systems (general, cardiovascular, respiratory, eyes, ENT, endocrine, GI, , M/S, neurological) were reviewed. Most pertinent finding(s) is/are: denies fever, cough, headaches, shortness of breath, chest pain, N/V, constipation/diarrhea, trouble urinating, aches and pains.  Does have some knee pain related to recent surgery.  The remaining  systems are all unremarkable.    Mental Status Examination (limited as this is by phone/video):  Appearance: Awake, alert, appears stated age, no acute distress, well-groomed   Attitude:  cooperative, pleasant   Motor: No gross abnormalities observed via video, not formally tested   Oriented to:  person, place, time, and situation  Attention Span and Concentration:  normal  Speech:  clear, coherent, regular rate, rhythm, and volume  Language: intact  Mood: Better  Affect:  appropriate and in normal range and mood congruent  Associations:  no loose associations  Thought Process:  logical, linear and goal oriented  Thought Content:  no evidence of suicidal ideation or homicidal ideation, no evidence of psychotic thought, no auditory hallucinations present and no visual hallucinations present  Recent and Remote Memory:  Intact to interview. Not formally assessed. No amnesia.  Fund of Knowledge: appropriate  Insight:  good  Judgment:  intact, adequate for safety  Impulse Control:  intact    Suicide Risk Assessment:  Today Jun Glez reports no suicidal ideation. Based on all available evidence including the factors cited above, Jun Glez does not appear to be at imminent risk for self-harm, does not meet criteria for a 72-hr hold, and therefore remains appropriate for ongoing outpatient level of care.  A thorough assessment of risk factors related to suicide and self-harm have been reviewed and are noted above. The patient convincingly denies suicidality on several occasions. Local community safety resources reviewed for patient to use if needed. There was no deceit detected, and the patient presented in a manner that was believable.     DSM5 Diagnosis:  Generalized anxiety disorder  PTSD  Alcohol dependence, uncomplicated in remission    Medical comorbidities include:   Patient Active Problem List    Diagnosis Date Noted     S/P laparoscopic appendectomy 06/25/2013     Priority: Medium        Psychosocial & Contextual Factors: see HPI above    Assessment:  From Intake, 8/2/2022:  Jun Glez is a 52-year-old male with past psychiatric history including PTSD and anxiety who presents today for psychiatric evaluation.  Patient has a history of working with psychiatric outpatient providers and also primary care provider regarding mental health symptoms.  Through questioning, it became quite apparent that the patient's difficult to treat anxiety symptoms are likely related to alcohol withdrawal he is experiencing on a near daily basis.  Patient reports feeling fine most mornings into late morning, very early afternoon but then by around noon time he will start to feel his blood pressure increased, pulse increase, will have increased sweating, sometimes stomach upset, and feeling of doom and significantly increased anxiety.  These are all symptoms of alcohol withdrawal.  Patient was educated on the symptoms and the pattern of alcohol withdrawal in relation to when he is drinking as 5-7 drinks every night.  This likely is becoming more of a thing the longer he has been drinking excessively and also ever since he switched to hard liquor 2-3 years ago (which correlates when the symptoms began occurring).  Patient was encouraged to decrease his alcohol use.  He declined the need for any chemical dependency resources or supports at this time.  If he cannot cut back on his own he is agreeable to reaching out to us for additional chemical dependency supports and resources for treatment or detox.  He is aware of the available resources.  At this time, he is agreeable to prescriptions for clonidine and gabapentin to treat his withdrawal symptoms and to support his attempts in cutting back his alcohol use.  Discussed off label use for these medications for this purpose and also risks and benefits of these medications.  We also reviewed the risks of using clonazepam with alcohol and patient denies that he  ever uses clonazepam with alcohol.  Individual psychotherapy strongly recommended.  No acute safety concerns or SI.  Patient will be seen back for follow-up.    9/13/2022:  Patient overall with some improvement of symptoms, particularly initially.  Initial improvement unfortunately did not last very long.  Patient was able to decrease his alcohol use somewhat significantly.  Still trying to decrease use further but still struggling some to eliminate completely.  Admits to using alcohol to self medicate.  Unclear still at this time what symptoms might be more related to anxiety and PTSD versus more subtle withdrawal.  Patient did not notice anything at all on lower dose of clonidine and so I do recommend titrating dose a little higher since I think decreased adrenergic drive will help quite significantly.  I think that will also help with nightmares and sleep.  Also discussed potentially adding mirtazapine at bedtime.  I would like to see him cut back a little more on alcohol use.  We will titrate gabapentin dose a little higher since it was quite helpful initially when first started.  Patient agrees to message me if he would like to change his dose of his medications.  No acute safety concerns.  No SI.  No drug use.    10/4/2022:  Patient overall doing a little better.  Has significantly cut back on alcohol use.  Gabapentin continues to be helpful.  Not so sure about clonidine.  We will wean off a.m. dose since causing some sedation and limited efficacy.  Since ongoing anxiety and PTSD related symptoms, we will start Viibryd.  Patient has had [employer] testing and Viibryd metabolized normally.  No acute safety concerns.  No SI.  No problematic drug or alcohol use.  Individual psychotherapy encouraged.    11/01/2022:  Patient overall doing relatively okay.  Some decreased stress while off work.  Having some side effects from Viibryd but thinks they are getting better over time.  Some stomach upset and loose stools.   Not too distressing.  Off clonidine without decompensation of symptoms.  Willing to continue Viibryd titration.  No acute safety concerns.  No SI.  Denies problematic drug or alcohol use.  Continues to try to cut back on his alcohol use but denies that it is causing any current problems.  Individual psychotherapy has been encouraged.    11/29/2022:  Patient overall doing well.  Feels like recent dose increase of Viibryd has been quite helpful.  Would like to continue current medications as is with no changes.  Could consider propranolol in the future if needed for daytime panic symptoms that are quite physical in nature including racing heart.  Would like to pass at this time.  If nightmares continue or become problematic/more distressing, could consider prazosin.  Patient feels like nightmares are not distressing enough to add a medication and that overall he is getting really good sleep at this time.  No acute safety concerns.  No SI.  Minimal alcohol use.  Actively looking for a therapist.    Medication side effects and alternatives were reviewed. Health promotion activities recommended and reviewed today. All questions addressed. Education and counseling completed regarding risks and benefits of medications and psychotherapy options. Recommend therapy for additional support.     Treatment Plan:    Continue gabapentin 900 mg 3 times daily for anxiety and alcohol cravings. Decrease as tolerated.     Continue Viibryd/vilazodone 20 mg daily for anxiety and PTSD.    Continue hydroxyzine 25-50 mg up to three times a day as needed for anxiety/sleep.     Continue all other cares per primary care provider.     Continue all other medications as reviewed per electronic medical record today.     Safety plan reviewed. To the Emergency Department as needed or call after hours crisis line at [phone] or [phone]. [place] Crisis Text Line. Text [place] to 676132 or Suicide LifeLine Chat:  suicidepreventionlifeline.org/chat    Consider individual psychotherapy for additional support and ongoing development of nonpharmacologic coping skills and strategies.    Schedule an appointment with me in 2 months or sooner as needed. Call [facility] at [phone] to schedule.    Follow up with primary care provider as planned or for acute medical concerns.    Call the psychiatric nurse line with medication questions or concerns at [phone].    [email or web address] may be used to communicate with your provider, but this is not intended to be used for emergencies.    Administrative Billing:   Phone Call/Video Duration: 10 Minutes  Start: 10:01a  Stop: 10:11a      Patient Status:  Patient will continue to be seen for ongoing consultation and stabilization.    Signed:   Leigh Ann Elam DO  [facility]    Disclaimer: This note consists of symbols derived from keyboarding, dictation and/or voice recognition software. As a result, there may be errors in the script that have gone undetected. Please consider this when interpreting information found in this chart.

## 2022-11-29 NOTE — Clinical Note
Please call this patient to get them scheduled for a follow-up visit in 2 months. Please schedule with me and the [facility]. Thanks!

## 2022-11-29 NOTE — PATIENT INSTRUCTIONS
Treatment Plan:  Continue gabapentin 900 mg 3 times daily for anxiety and alcohol cravings. Decrease as tolerated.   Continue Viibryd/vilazodone 20 mg daily for anxiety and PTSD.  Continue hydroxyzine 25-50 mg up to three times a day as needed for anxiety/sleep.   Continue all other cares per primary care provider.   Continue all other medications as reviewed per electronic medical record today.   Safety plan reviewed. To the Emergency Department as needed or call after hours crisis line at [phone] or [phone]. [place] Crisis Text Line. Text [place] to 514848 or Suicide LifeLine Chat: suicidepreventionlifeline.org/chat  Consider individual psychotherapy for additional support and ongoing development of nonpharmacologic coping skills and strategies.  Schedule an appointment with me in 2 months or sooner as needed. Call [facility] at [phone] to schedule.  Follow up with primary care provider as planned or for acute medical concerns.  Call the psychiatric nurse line with medication questions or concerns at [phone].  MyChart may be used to communicate with your provider, but this is not intended to be used for emergencies.

## 2022-12-08 ENCOUNTER — TRANSFERRED RECORDS (OUTPATIENT)
Dept: HEALTH INFORMATION MANAGEMENT | Facility: CLINIC | Age: 52
End: 2022-12-08

## 2023-01-06 DIAGNOSIS — F41.1 GAD (GENERALIZED ANXIETY DISORDER): ICD-10-CM

## 2023-01-06 DIAGNOSIS — F43.10 PTSD (POST-TRAUMATIC STRESS DISORDER): ICD-10-CM

## 2023-01-06 RX ORDER — VILAZODONE HYDROCHLORIDE 20 MG/1
20 TABLET ORAL DAILY
Qty: 30 TABLET | Refills: 1 | Status: SHIPPED | OUTPATIENT
Start: 2023-01-06 | End: 2023-03-28

## 2023-01-06 NOTE — TELEPHONE ENCOUNTER
Date of Last Office Visit: 11/29/22  Date of Next Office Visit: None scheduled. RN spoke to the patient to remind him he needs a future appointment with Dr. Elam.  Jun indicated he will be making a future appointment with Dr. Elam today via [employer] scheduling.     No shows since last visit: no  Cancellations since last visit: no    Medication requested: vilazodone (VIIBRYD) 20 MG TABS tablet Date last ordered: 11/15/22 Qty: 30 Refills: 1         Lapse in medication adherence greater than 5 days?: no  If yes, call patient and gather details: na  Medication refill request verified as identical to current order?: yes  Result of Last DAM, VPA, Li+ Level, CBC, or Carbamazepine Level (at or since last visit): N/A    Last visit treatment plan: Treatment Plan:    Continue gabapentin 900 mg 3 times daily for anxiety and alcohol cravings. Decrease as tolerated.     Continue Viibryd/vilazodone 20 mg daily for anxiety and PTSD.    Continue hydroxyzine 25-50 mg up to three times a day as needed for anxiety/sleep.     Continue all other cares per primary care provider.     Continue all other medications as reviewed per electronic medical record today.     Safety plan reviewed. To the Emergency Department as needed or call after hours crisis line at [phone] or [phone]. [place] Crisis Text Line. Text [place] to 987047 or Suicide LifeLine Chat: suicidepreventionlifeline.org/chat    Consider individual psychotherapy for additional support and ongoing development of nonpharmacologic coping skills and strategies.    Schedule an appointment with me in 2 months or sooner as needed. Call [facility] at [phone] to schedule.    Follow up with primary care provider as planned or for acute medical concerns.    Call the psychiatric nurse line with medication questions or concerns at [phone].  [employer] may be used to communicate with your provider, but this is not intended to be used for  emergencies.    []Medication refilled per  Medication Refill in Ambulatory Care  policy.  [x]Medication unable to be refilled by RN due to criteria not met as indicated below:    []Eligibility - not seen in the last year   [x]Supervision - no future appointment   []Compliance - no shows, cancellations or lapse in therapy   []Verification - order discrepancy   [x]Controlled medication   [x]Medication not included in policy   []90-day supply request   []Other

## 2023-01-17 ENCOUNTER — MYC MEDICAL ADVICE (OUTPATIENT)
Dept: PSYCHIATRY | Facility: CLINIC | Age: 53
End: 2023-01-17
Payer: COMMERCIAL

## 2023-01-18 NOTE — CONFIDENTIAL NOTE
Could an RN check-in with pt please and see if he is requesting anything specific from me/us for his therapist before his next visit? Thanks!

## 2023-01-18 NOTE — TELEPHONE ENCOUNTER
Routing [employer] message with ARYA to Dr. Elam. Patient next appt 1/24/23.    Brissa Moura RN on 1/18/2023 at 3:46 PM

## 2023-01-19 NOTE — CONFIDENTIAL NOTE
"    1) RN reviewed provider message: \"Could an RN check-in with pt please and see if he is requesting anything specific from me/us for his therapist before his next visit? Thanks!\"    2) RN LVM requesting that pt clarify how the clinic can help him r/t the ARYA he sent via [employer] for the psychotherapist he has been seeing since October.     3) RN also sending the above via [employer].     4) RN encouraged pt to reach out via [employer] or [phone] with any additional needs.     Radha Bishop RN on 1/19/2023 at 4:15 PM    "

## 2023-03-27 ASSESSMENT — ANXIETY QUESTIONNAIRES
2. NOT BEING ABLE TO STOP OR CONTROL WORRYING: NEARLY EVERY DAY
GAD7 TOTAL SCORE: 18
8. IF YOU CHECKED OFF ANY PROBLEMS, HOW DIFFICULT HAVE THESE MADE IT FOR YOU TO DO YOUR WORK, TAKE CARE OF THINGS AT HOME, OR GET ALONG WITH OTHER PEOPLE?: SOMEWHAT DIFFICULT
1. FEELING NERVOUS, ANXIOUS, OR ON EDGE: NEARLY EVERY DAY
GAD7 TOTAL SCORE: 18
1. FEELING NERVOUS, ANXIOUS, OR ON EDGE: NEARLY EVERY DAY
4. TROUBLE RELAXING: MORE THAN HALF THE DAYS
7. FEELING AFRAID AS IF SOMETHING AWFUL MIGHT HAPPEN: NEARLY EVERY DAY
GAD7 TOTAL SCORE: 18
3. WORRYING TOO MUCH ABOUT DIFFERENT THINGS: NEARLY EVERY DAY
GAD7 TOTAL SCORE: 18
IF YOU CHECKED OFF ANY PROBLEMS ON THIS QUESTIONNAIRE, HOW DIFFICULT HAVE THESE PROBLEMS MADE IT FOR YOU TO DO YOUR WORK, TAKE CARE OF THINGS AT HOME, OR GET ALONG WITH OTHER PEOPLE: SOMEWHAT DIFFICULT
5. BEING SO RESTLESS THAT IT IS HARD TO SIT STILL: SEVERAL DAYS
2. NOT BEING ABLE TO STOP OR CONTROL WORRYING: NEARLY EVERY DAY
4. TROUBLE RELAXING: MORE THAN HALF THE DAYS
8. IF YOU CHECKED OFF ANY PROBLEMS, HOW DIFFICULT HAVE THESE MADE IT FOR YOU TO DO YOUR WORK, TAKE CARE OF THINGS AT HOME, OR GET ALONG WITH OTHER PEOPLE?: SOMEWHAT DIFFICULT
6. BECOMING EASILY ANNOYED OR IRRITABLE: NEARLY EVERY DAY
7. FEELING AFRAID AS IF SOMETHING AWFUL MIGHT HAPPEN: NEARLY EVERY DAY
GAD7 TOTAL SCORE: 18
IF YOU CHECKED OFF ANY PROBLEMS ON THIS QUESTIONNAIRE, HOW DIFFICULT HAVE THESE PROBLEMS MADE IT FOR YOU TO DO YOUR WORK, TAKE CARE OF THINGS AT HOME, OR GET ALONG WITH OTHER PEOPLE: SOMEWHAT DIFFICULT
5. BEING SO RESTLESS THAT IT IS HARD TO SIT STILL: SEVERAL DAYS
3. WORRYING TOO MUCH ABOUT DIFFERENT THINGS: NEARLY EVERY DAY
GAD7 TOTAL SCORE: 18
7. FEELING AFRAID AS IF SOMETHING AWFUL MIGHT HAPPEN: NEARLY EVERY DAY
6. BECOMING EASILY ANNOYED OR IRRITABLE: NEARLY EVERY DAY
7. FEELING AFRAID AS IF SOMETHING AWFUL MIGHT HAPPEN: NEARLY EVERY DAY

## 2023-03-27 ASSESSMENT — PATIENT HEALTH QUESTIONNAIRE - PHQ9
SUM OF ALL RESPONSES TO PHQ QUESTIONS 1-9: 13
SUM OF ALL RESPONSES TO PHQ QUESTIONS 1-9: 13

## 2023-03-28 ENCOUNTER — VIRTUAL VISIT (OUTPATIENT)
Dept: BEHAVIORAL HEALTH | Facility: CLINIC | Age: 53
End: 2023-03-28
Payer: COMMERCIAL

## 2023-03-28 ENCOUNTER — VIRTUAL VISIT (OUTPATIENT)
Dept: PSYCHIATRY | Facility: CLINIC | Age: 53
End: 2023-03-28
Payer: COMMERCIAL

## 2023-03-28 DIAGNOSIS — F43.10 PTSD (POST-TRAUMATIC STRESS DISORDER): Primary | ICD-10-CM

## 2023-03-28 DIAGNOSIS — F43.10 PTSD (POST-TRAUMATIC STRESS DISORDER): ICD-10-CM

## 2023-03-28 DIAGNOSIS — F41.1 GAD (GENERALIZED ANXIETY DISORDER): Primary | ICD-10-CM

## 2023-03-28 DIAGNOSIS — F10.21 ALCOHOL DEPENDENCE IN REMISSION (H): ICD-10-CM

## 2023-03-28 PROCEDURE — 99214 OFFICE O/P EST MOD 30 MIN: CPT | Mod: VID | Performed by: PSYCHIATRY & NEUROLOGY

## 2023-03-28 PROCEDURE — 99207 PR NO BILLABLE SERVICE THIS VISIT: CPT | Mod: VID | Performed by: SOCIAL WORKER

## 2023-03-28 RX ORDER — HYDROXYZINE HYDROCHLORIDE 50 MG/1
25-50 TABLET, FILM COATED ORAL 3 TIMES DAILY PRN
Qty: 270 TABLET | Refills: 1 | Status: SHIPPED | OUTPATIENT
Start: 2023-03-28 | End: 2024-04-23

## 2023-03-28 ASSESSMENT — PATIENT HEALTH QUESTIONNAIRE - PHQ9
10. IF YOU CHECKED OFF ANY PROBLEMS, HOW DIFFICULT HAVE THESE PROBLEMS MADE IT FOR YOU TO DO YOUR WORK, TAKE CARE OF THINGS AT HOME, OR GET ALONG WITH OTHER PEOPLE: SOMEWHAT DIFFICULT
SUM OF ALL RESPONSES TO PHQ QUESTIONS 1-9: 13

## 2023-03-28 ASSESSMENT — ANXIETY QUESTIONNAIRES: GAD7 TOTAL SCORE: 18

## 2023-03-28 NOTE — Clinical Note
Pt has been dismissed from [facility] due to completion of services.  Pt to return to PCP.  PCP will need to place new referral for [facility] services if needed.  OP intake: please dimiss pt from [facility] at this time.  Thanks!

## 2023-03-28 NOTE — PROGRESS NOTES
"Telemedicine Visit: The patient's condition can be safely assessed and treated via synchronous audio and visual telemedicine encounter.      Reason for Telemedicine Visit: Patient has requested telehealth visit    Originating Site (Patient Location): Patient's home    Distant Location (provider location):  Off-site    Consent:  The patient/guardian has verbally consented to: the potential risks and benefits of telemedicine (video visit) versus in person care; bill my insurance or make self-payment for services provided; and responsibility for payment of non-covered services.     Mode of Communication:  Video Conference via [facility]    As the provider I attest to compliance with applicable laws and regulations related to telemedicine.        Outpatient Psychiatric Progress Note    Name: Jun Dumontanil   : 1970                    Primary Care Provider: Physician No Ref-Primary   Therapist: None     PHQ-9 scores:  PHQ-9 SCORE 2022 2022 3/27/2023   PHQ-9 Total Score MyChart - 16 (Moderately severe depression) 13 (Moderate depression)   PHQ-9 Total Score 11 16 13       JULIENNE-7 scores:  JULIENNE-7 SCORE 10/3/2022 3/27/2023 3/27/2023   Total Score 17 (severe anxiety) - 18 (severe anxiety)   Total Score 17 18 18       Patient Identification:  Patient is a 53 year old, partnered / significant other  White  or  male  who presents for return visit with me.  Patient is currently employed full time. Patient attended the phone/video session alone. Patient prefers to be called: \"Jun\".    Interim History:  I last saw Jun L John Dey for outpatient psychiatry return vist on 2022. During that appointment, we:      Continue gabapentin 900 mg 3 times daily for anxiety and alcohol cravings. Decrease as tolerated.     Continue Viibryd/vilazodone 20 mg daily for anxiety and PTSD.    Continue hydroxyzine 25-50 mg up to three times a day as needed for anxiety/sleep.     Continue all other cares " per primary care provider.     3/28: Patient has not been seen since November.  Overall doing relatively okay.  Symptoms are not interfering too much with functioning.  Has continued to cut back on alcohol use.  Is only taking hydroxyzine as needed.  Takes it usually once or twice a day.  Feels like it is helpful for anxiety.  Weaned self off gabapentin and Viibryd since did not feel they were effective.  Denies any problems coming off the Viibryd or gabapentin.  Unfortunately with insurance change, the co-pay is quite high and patient reports he will be unable to visit with us again in the future.  Patient reports he is not interested at this time to trial other psychiatric medications.  No acute safety concerns.  No SI.  Denies problematic drug or alcohol use.  Patient is no longer in psychotherapy.    Per [facility], during today's team-based visit:  MH update: Pt reports that there hasn't been any major change since November. He reports that the co-pay for his visits have increased so he won't be able to continue seeing us.  He's only taking hydroxyzine PRN and would like to continue taking it. He's frustrated with lack of effectiveness of medication and doesn't think he'll ever find one that does work. Ongoing trauma symptoms.  Stresses: work  Appetite: unremarkable  Sleep: unremarkable  Therapy: No   YUMI: No  Preg: NA  Most important: only taking hydroxyzine, would like to continue     Past Psychiatric Med Trials:  Psych Meds at Intake:  Buspar - pt reported not taking   Klonopin - rare prn     Past Psych Meds:  escitalopram   venlafaxine  buspar - up to 45 mg daily and felt dizzy  clonazepam  Likely others he cannot remember    Psychiatric ROS:  Jun Glez reports mood has been: Okay  Anxiety has been: Okay, relatively manageable  Sleep has been: Up and down  Doris sxs: None  Psychosis sxs: None  ADHD/ADD sxs: N/A  PTSD sxs: See HPI above  PHQ9 and GAD7 scores were reviewed today if  completed.   Medication side effects: Some sedation from hydroxyzine  Current stressors include: Symptoms and See HPI above  Coping mechanisms and supports include: Family, Hobbies and Friends    Current medications include:   Current Outpatient Medications   Medication Sig     gabapentin (NEURONTIN) 300 MG capsule Take 3 capsules (900 mg) by mouth 3 times daily     hydrOXYzine (ATARAX) 25 MG tablet Take 1-2 tablets (25-50 mg) by mouth 3 times daily as needed for anxiety     magnesium oxide (MAG-OX) 400 MG tablet Take 400 mg by mouth daily     vilazodone (VIIBRYD) 20 MG TABS tablet Take 1 tablet (20 mg) by mouth daily     vitamin B complex with vitamin C (STRESS TAB) tablet Take 1 tablet by mouth daily     No current facility-administered medications for this visit.       Past Medical/Surgical History:  Past Medical History:   Diagnosis Date     Acute appendicitis with peritoneal abscess 07/09/2013     Hypertension       has a past medical history of Acute appendicitis with peritoneal abscess (07/09/2013) and Hypertension ().    He has no past medical history of Arthritis, Cancer (H), Cerebral infarction (H), Congestive heart failure (H), COPD (chronic obstructive pulmonary disease) (H), Depressive disorder, Diabetes (H), Heart disease, History of blood transfusion, Thyroid disease, or Uncomplicated asthma.    Social History:  Reviewed. No changes to social history except as noted above in HPI.    Vital Signs:   None. This is phone/video visit.     Labs:  Most recent laboratory results reviewed and no new labs.     Review of Systems:  10 systems (general, cardiovascular, respiratory, eyes, ENT, endocrine, GI, , M/S, neurological) were reviewed. Most pertinent finding(s) is/are: denies fever, cough, headaches, shortness of breath, chest pain, N/V, constipation/diarrhea, trouble urinating, aches and pains.  Does have some knee pain related to recent surgery.  The remaining systems are all  unremarkable.    Mental Status Examination (limited as this is by phone/video):  Appearance: Awake, alert, appears stated age, no acute distress, well-groomed   Attitude:  cooperative, pleasant   Motor: No gross abnormalities observed via video, not formally tested   Oriented to:  person, place, time, and situation  Attention Span and Concentration:  normal  Speech:  clear, coherent, regular rate, rhythm, and volume  Language: intact  Mood: ok  Affect:  appropriate and in normal range and mood congruent  Associations:  no loose associations  Thought Process:  logical, linear and goal oriented  Thought Content:  no evidence of suicidal ideation or homicidal ideation, no evidence of psychotic thought, no auditory hallucinations present and no visual hallucinations present  Recent and Remote Memory:  Intact to interview. Not formally assessed. No amnesia.  Fund of Knowledge: appropriate  Insight:  good  Judgment:  intact, adequate for safety  Impulse Control:  intact    Suicide Risk Assessment:  Today Jun Glez reports no suicidal ideation. Based on all available evidence including the factors cited above, Jun Glez does not appear to be at imminent risk for self-harm, does not meet criteria for a 72-hr hold, and therefore remains appropriate for ongoing outpatient level of care.  A thorough assessment of risk factors related to suicide and self-harm have been reviewed and are noted above. The patient convincingly denies suicidality on several occasions. Local community safety resources reviewed for patient to use if needed. There was no deceit detected, and the patient presented in a manner that was believable.     DSM5 Diagnosis:  Generalized anxiety disorder  PTSD  Alcohol dependence, uncomplicated in remission    Medical comorbidities include:   Patient Active Problem List    Diagnosis Date Noted     S/P laparoscopic appendectomy 06/25/2013     Priority: Medium       Psychosocial & Contextual  Factors: see HPI above    Assessment:  From Intake, 8/2/2022:  Jun Glez is a 52-year-old male with past psychiatric history including PTSD and anxiety who presents today for psychiatric evaluation.  Patient has a history of working with psychiatric outpatient providers and also primary care provider regarding mental health symptoms.  Through questioning, it became quite apparent that the patient's difficult to treat anxiety symptoms are likely related to alcohol withdrawal he is experiencing on a near daily basis.  Patient reports feeling fine most mornings into late morning, very early afternoon but then by around noon time he will start to feel his blood pressure increased, pulse increase, will have increased sweating, sometimes stomach upset, and feeling of doom and significantly increased anxiety.  These are all symptoms of alcohol withdrawal.  Patient was educated on the symptoms and the pattern of alcohol withdrawal in relation to when he is drinking as 5-7 drinks every night.  This likely is becoming more of a thing the longer he has been drinking excessively and also ever since he switched to hard liquor 2-3 years ago (which correlates when the symptoms began occurring).  Patient was encouraged to decrease his alcohol use.  He declined the need for any chemical dependency resources or supports at this time.  If he cannot cut back on his own he is agreeable to reaching out to us for additional chemical dependency supports and resources for treatment or detox.  He is aware of the available resources.  At this time, he is agreeable to prescriptions for clonidine and gabapentin to treat his withdrawal symptoms and to support his attempts in cutting back his alcohol use.  Discussed off label use for these medications for this purpose and also risks and benefits of these medications.  We also reviewed the risks of using clonazepam with alcohol and patient denies that he ever uses clonazepam with alcohol.   Individual psychotherapy strongly recommended.  No acute safety concerns or SI.  Patient will be seen back for follow-up.    9/13/2022:  Patient overall with some improvement of symptoms, particularly initially.  Initial improvement unfortunately did not last very long.  Patient was able to decrease his alcohol use somewhat significantly.  Still trying to decrease use further but still struggling some to eliminate completely.  Admits to using alcohol to self medicate.  Unclear still at this time what symptoms might be more related to anxiety and PTSD versus more subtle withdrawal.  Patient did not notice anything at all on lower dose of clonidine and so I do recommend titrating dose a little higher since I think decreased adrenergic drive will help quite significantly.  I think that will also help with nightmares and sleep.  Also discussed potentially adding mirtazapine at bedtime.  I would like to see him cut back a little more on alcohol use.  We will titrate gabapentin dose a little higher since it was quite helpful initially when first started.  Patient agrees to message me if he would like to change his dose of his medications.  No acute safety concerns.  No SI.  No drug use.    10/4/2022:  Patient overall doing a little better.  Has significantly cut back on alcohol use.  Gabapentin continues to be helpful.  Not so sure about clonidine.  We will wean off a.m. dose since causing some sedation and limited efficacy.  Since ongoing anxiety and PTSD related symptoms, we will start Viibryd.  Patient has had Gene[employer] testing and Viibryd metabolized normally.  No acute safety concerns.  No SI.  No problematic drug or alcohol use.  Individual psychotherapy encouraged.    11/01/2022:  Patient overall doing relatively okay.  Some decreased stress while off work.  Having some side effects from Viibryd but thinks they are getting better over time.  Some stomach upset and loose stools.  Not too distressing.  Off clonidine  without decompensation of symptoms.  Willing to continue Viibryd titration.  No acute safety concerns.  No SI.  Denies problematic drug or alcohol use.  Continues to try to cut back on his alcohol use but denies that it is causing any current problems.  Individual psychotherapy has been encouraged.    11/29/2022:  Patient overall doing well.  Feels like recent dose increase of Viibryd has been quite helpful.  Would like to continue current medications as is with no changes.  Could consider propranolol in the future if needed for daytime panic symptoms that are quite physical in nature including racing heart.  Would like to pass at this time.  If nightmares continue or become problematic/more distressing, could consider prazosin.  Patient feels like nightmares are not distressing enough to add a medication and that overall he is getting really good sleep at this time.  No acute safety concerns.  No SI.  Minimal alcohol use.  Actively looking for a therapist.    3/28/23:  Patient with ongoing anxiety symptoms but more manageable despite tapering self off gabapentin and Viibryd.  Patient managing adequately on as needed hydroxyzine.  Patient advised that individual psychotherapy, particularly trauma-based, is still strongly recommended.  Patient encouraged to pursue accelerated resolution therapy if able in the future.  No acute safety concerns.  No SI.  No problematic drug or alcohol use at this time.  Patient has significantly cut back alcohol use and denies any current problematic use.  Patient's care will be returned back to primary care provider at this time due to cost of visits being a barrier to ongoing care for the patient.    Medication side effects and alternatives were reviewed. Health promotion activities recommended and reviewed today. All questions addressed. Education and counseling completed regarding risks and benefits of medications and psychotherapy options. Recommend therapy for additional support.      Treatment Plan:    Discontinue gabapentin (already stopped)    Discontinue Viibryd/vilazodone  (already stopped)    Continue hydroxyzine 25-50 mg up to three times a day as needed for anxiety/sleep.     Your psychiatric care is being returned back to your primary care provider for ongoing management.  Please reach out to your primary care provider with any questions or concerns about your mental health or mental health medications.    Continue all other cares per primary care provider.     Continue all other medications as reviewed per electronic medical record today.     Safety plan reviewed. To the Emergency Department as needed or call after hours crisis line at [phone] or [phone]. [place] Crisis Text Line. Text [place] to 804444 or Suicide LifeLine Chat: suicidepreGeoVarioline.org/chat    Consider individual psychotherapy for additional support and ongoing development of nonpharmacologic coping skills and strategies.  Discussed ART (accelerated resolution therapy).    Follow up with primary care provider as planned or for acute medical concerns.    MyChart may be used to communicate with your provider, but this is not intended to be used for emergencies.    Thank you for our work together in the Psychiatry Collaborative Care Model at [facility]. This is our last visit and I am returning your care back to your Primary Care Provider Physician No Ref-Primary . If you are not doing well, please contact your Primary Care Provider office.     Administrative Billing:   Phone Call/Video Duration: 12 Minutes  Start: 10:01a  Stop: 1:17p      Patient Status:  The patient is being returned to the referring provider for ongoing care and medication prescribing.  The patient can be re-referred back to this service for further consultation if needed in the future.    Signed:   Leigh Ann Elam DO  [facility]    Disclaimer: This note consists of symbols derived from keyboarding, dictation and/or  voice recognition software. As a result, there may be errors in the script that have gone undetected. Please consider this when interpreting information found in this chart.

## 2023-03-28 NOTE — PROGRESS NOTES
[facility]  March 28 2023      Behavioral Health Clinician Progress Note    Patient Name: Jun Glez           Service Type:  Individual      Service Location:   MyChart / Email (patient reached)     Session Start Time: 1230pm  Session End Time: 1240      Session Length: 10 min     Attendees: Patient     Service Modality:  Video Visit:      Provider verified identity through the following two step process.  Patient provided:  Patient is known previously to provider    Telemedicine Visit: The patient's condition can be safely assessed and treated via synchronous audio and visual telemedicine encounter.      Reason for Telemedicine Visit: Services only offered telehealth    Originating Site (Patient Location): Patient's home    Distant Site (Provider Location): Provider Remote Setting- Home Office    Consent:  The patient/guardian has verbally consented to: the potential risks and benefits of telemedicine (video visit) versus in person care; bill my insurance or make self-payment for services provided; and responsibility for payment of non-covered services.     Patient would like the video invitation sent by:  My Chart    Mode of Communication:  Video Conference via Amwell    As the provider I attest to compliance with applicable laws and regulations related to telemedicine.    Visit Activities (Refresh list every visit): [facility] Emergency Department Only    Diagnostic Assessment Date: 08/02/2022  Treatment Plan Review Date: 06/13/2023  See Flowsheets for today's PHQ-9 and JULIENNE-7 results  Previous PHQ-9:   PHQ-9 SCORE 7/11/2022 9/13/2022 3/27/2023   PHQ-9 Total Score MyChart - 16 (Moderately severe depression) 13 (Moderate depression)   PHQ-9 Total Score 11 16 13     Previous JULIENNE-7:   JULIENNE-7 SCORE 10/3/2022 3/27/2023 3/27/2023   Total Score 17 (severe anxiety) - 18 (severe anxiety)   Total Score 17 18 18       ISABELL LEVEL:  No flowsheet data found.    DATA  Extended Session (60+  "minutes): No  Interactive Complexity: No  Crisis: No  [facility] Patient: No    Treatment Objective(s) Addressed in This Session:  Target Behavior(s): alcohol use and anxiety    Anxiety: will experience a reduction in anxiety and will develop more effective coping skills to manage anxiety symptoms  Alcohol / Substance Use: will increase understanding of the effects of substance use  will make healthier choices in regard to substance use  PTSD Symptom Management      Current Stressors / Issues:  3/28/2023:   update: Pt reports that there hasn't been any major change since November. He reports that the co-pay for his visits have increased so he won't be able to continue seeing us.  He's only taking hydroxyzine PRN and would like to continue taking it. He's frustrated with lack of effectiveness of medication and doesn't think he'll ever find one that does work. Ongoing trauma symptoms.  Stresses: work  Appetite: unremarkable  Sleep: unremarkable  Therapy: No   YUMI: No  Preg: NA  Most important: only taking hydroxyzine, would like to continue     11/29/2022:Reported that he is still on light duty because of his knee but is doing well overall. He noted that he has had fewer stressors lately because he has been recovering. He would like to reduce the gabapentin to 900 mg in the morning and feels the Viibryd with hydroxyzine are working well. He remains essentially sober from alcohol. The dreams are more intense. She said that he reached out to some of the therapists with no call backs yet. He noted that he has only had 4-5 intense anxiety problems but they do not last as long. He still has some GI side effects but he thinks it is getting better. \"It's tolerable and it's worth it.\"       11/01/2022:  Reported that he had a knee surgery and has been out of work for about 3 weeks and recovering well. He did not increase the Viibryd because of side effects (GI side effects).  He is completely off clonidine now for about 2 weeks " "now. He is not noticing a difference with the gabapentin. He noted that his alcohol use is much less and continues to be sporadic but not excessive (reduced by at least half since last appointment). Sleep tends to be ok. He still wakes up and has difficulty falling back to sleep. He still has nightmares and they are still quite graphic and still 3-4 times per week. He would like to discuss possibly increasing Viibryd but needs clarity about how to know if Viibryd or the gabapentin is working. Following his knee surgery, he was prescribed Vistaril and has found that it has helped his anxiety and would like to discuss if he can use it in the future.      10/04/2022:  Reported that gabapentin seems to have worked to some extent. He notices that he is a little more relaxed. He has only been using 1 clonidine in the morning because it was too sedating. He finds that his nightmares are more graphic since he stopped drinking. He has chosen to cut back on alcohol and has not had anything to drink in almost 2 weeks. He spoke about his search for a therapist and is awaiting a call back from some places.      09/13/2022:  Reported that gabapentin was working very well for him. However, it eventually it stopped working. When it was working, he was not having anxiety attacks during the day. \"It was amazing.\" However, the effects wore off after about 3 weeks. Now he notices that the anxiety lasts all day. However, clonidine did not make any difference for him which was consistent with his reported history. He cut back on his alcohol use to about half his prior amount. The nightmares happen every night though they do not always wake him up; they are vivid and thematically related to experiences he has had at work. He did not refill the clonidine and is open to starting it again if it will help with the anxiety. He continues to take magnesium and B-complex. We discussed therapy options. He reviewed recommendations offered last " "appointment and would like to work with an ART provider. Will work with him to find a therapist.      08/02/2022 (DA):  Reported that a few years ago he was having chest pain and thought he was having blood pressure problems. 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 Spoke with his colleague and was told it was panic attacks. He saw someone at [facility] but prefers to stay with [facility]. [facility] tried a few medications and was not going well. Did GeneSight testing and had no help. Most recently was on Buspar and had side-effects and stopped it abruptly. He has tried 3 medications (escitalopram, venlaflaxine, buspar, and clonazepam) but they have not helped so much. He has started using magnesium and and vitamin B complex which has improved his sleep but still has nightmares. He has been diagnosed with JULIENNE and eventually PTSD. \"Right now I'm just dealing with it.\" He exercises 3-4x a week which helps with stress to some extent. He is having panic symptoms for 7-10 hours at a time. He does not feel like he is having panic attacks but is certainly having high anxiety and concerned with how his blood pressure increases. Had been working with a therapist at [facility] who suggested EMDR but he watched something on YouTube about it but it did not seem like something that would be helpful for him. Spoke about having anxiety about anticipating when his pager will go off and \"what will we see next?\" This is has been starting to happen more even on his days off which is new. He has decreased his hobbies the last few months which is also unusual for him.      Progress on Treatment Objective(s) / Homework:  Satisfactory progress - PREPARATION (Decided to change - considering how); Intervened by negotiating a change plan and determining options / strategies for behavior change, identifying triggers, exploring social supports, and working towards setting a date to begin behavior " change    Also provided psychoeducation about behavioral health condition, symptoms, and treatment options    Care Plan review completed: Yes    Medication Review:  Changes to psychiatric medications, see updated Medication List in EPIC.     Medication Compliance:  Yes      Changes in Health Issues:   None reported    Chemical Use Review:   Substance Use: decrease in alcohol .  Patient reports frequency of use half as much from intake.  Stage of Change: Preparatory        Tobacco Use: No current tobacco use.      Assessment: Current Emotional / Mental Status (status of significant symptoms):  Risk status (Self / Other harm or suicidal ideation)  Patient denies a history of suicidal ideation, suicide attempts, self-injurious behavior, homicidal ideation, homicidal behavior and and other safety concerns  Patient denies current fears or concerns for personal safety.  Patient denies current or recent suicidal ideation or behaviors.  Patient denies current or recent homicidal ideation or behaviors.  Patient denies current or recent self injurious behavior or ideation.  Patient denies other safety concerns.  A safety and risk management plan has not been developed at this time, however patient was encouraged to call [place] [phone] should there be a change in any of these risk factors.    Appearance:   Appropriate   Eye Contact:   Good   Psychomotor Behavior: Normal   Attitude:   Cooperative   Orientation:   All  Speech   Rate / Production: Normal    Volume:  Normal   Mood:    Anxious   Affect:    Appropriate   Thought Content:  Clear   Thought Form:  Coherent  Logical   Insight:    Good     Diagnoses:  1. PTSD (post-traumatic stress disorder)        Collateral Reports Completed:  Communicated with: Dr. Elam    Plan: (Homework, other):  Patient was given information about behavioral services and encouraged to schedule a follow up appointment with the clinic [facility].  He was also  given information about mental health symptoms and treatment options .  CD Recommendations: Practice Harm Reduction: Alcohol.  Alejo Cervantes Christal, LP      ______________________________________________________________________    [facility] - [place]: Treatment Plan    Patient's Name: Jun Glez  YOB: 1970    Date of Creation: September 13, 2022  Date Treatment Plan Last Reviewed/Revised: September 13, 2022    DSM5 Diagnoses: 300.02 (F41.1) Generalized Anxiety Disorder, 309.81 (F43.10) Posttraumatic Stress Disorder (includes Posttraumatic Stress Disorder for Children 6 Years and Younger)  Without dissociative symptoms or Substance-Related & Addictive Disorders Alcohol Use Disorder   303.90 (F10.20) Moderate    Psychosocial / Contextual Factors: occupational stressors  PROMIS (reviewed every 90 days):   PROMIS 10-Global Health (only subscores and total score):   PROMIS-10 Scores Only 7/4/2022 10/3/2022 3/27/2023 3/27/2023   Global Mental Health Score 8 9 8 8   Global Physical Health Score 14 16 16 16   PROMIS TOTAL - SUBSCORES 22 25 24 24       Referral / Collaboration:  Referral to another professional/service is not indicated at this time..    Anticipated number of session for this episode of care: 5-6  Anticipation frequency of session: As determined by Dr. Elam  Anticipated Duration of each session: 16-37 minutes  Treatment plan will be reviewed in 90 days or when goals have been changed.       MeasurableTreatment Goal(s) related to diagnosis / functional impairment(s)  Goal 1: Patient will work with providers to manage symptoms    I will know I've met my goal when I stop feeling so anxious.      Objective #A (Patient Action)  Patient will attend all appointments, take medication as prescribed.  Status: New - Date: 09/13/2022     Intervention(s)  [facility] will Monitor and assist in overcoming barriers to treatment adherence    Objective #B  Patient will  consider all recommendations offered.  Status: Continued - Date(s):6/13/2023      Intervention(s)  [facility] will educate patient on treatment options, clarify concerns, work with pt to overcome any resistance to compliance.      Patient has reviewed and agreed to the above plan.      Margaret Ward, [facility]  03/28/2023

## 2023-03-28 NOTE — Clinical Note
Psychiatry Update/Consult. I am returning Jun Glez's psychiatric care back to you for ongoing management and medication prescribing.  Patient has graduated from [facility] due to ongoing stability and readiness to return to primary care provider. Future medication refills will come from you (I gave 3-6 months today). I recommended that the patient follow up with you in about 3 months for a mental health visit. More details about treatment plan are in my note. If you have any questions or concerns, please don't hesitate to reach out.   Thanks for the referral! It was a pleasure working with Jun Glez.   Leigh Ann Elam, DO [facility] Psychiat[facility]

## 2023-03-29 NOTE — PATIENT INSTRUCTIONS
Treatment Plan:  Discontinue gabapentin (already stopped)  Discontinue Viibryd/vilazodone  (already stopped)  Continue hydroxyzine 25-50 mg up to three times a day as needed for anxiety/sleep.   Your psychiatric care is being returned back to your primary care provider for ongoing management.  Please reach out to your primary care provider with any questions or concerns about your mental health or mental health medications.  Continue all other cares per primary care provider.   Continue all other medications as reviewed per electronic medical record today.   Safety plan reviewed. To the Emergency Department as needed or call after hours crisis line at [phone] or [phone]. [place] Crisis Text Line. Text [place] to 057034 or Suicide LifeLine Chat: suicidepreventionlifeline.org/chat  Consider individual psychotherapy for additional support and ongoing development of nonpharmacologic coping skills and strategies.  Discussed ART (accelerated resolution therapy).  Follow up with primary care provider as planned or for acute medical concerns.  MyChart may be used to communicate with your provider, but this is not intended to be used for emergencies.    Thank you for our work together in the Psychiatry Collaborative Care Model at [facility]. This is our last visit and I am returning your care back to your Primary Care Provider Physician No Ref-Primary . If you are not doing well, please contact your Primary Care Provider office.

## 2023-07-24 ENCOUNTER — MYC REFILL (OUTPATIENT)
Dept: PSYCHIATRY | Facility: CLINIC | Age: 53
End: 2023-07-24
Payer: COMMERCIAL

## 2023-07-24 DIAGNOSIS — F41.1 GAD (GENERALIZED ANXIETY DISORDER): ICD-10-CM

## 2023-07-25 RX ORDER — HYDROXYZINE HYDROCHLORIDE 50 MG/1
25-50 TABLET, FILM COATED ORAL 3 TIMES DAILY PRN
OUTPATIENT
Start: 2023-07-25

## 2023-07-25 NOTE — CONFIDENTIAL NOTE
Incoming refill request for hydrOXYzine (ATARAX) 50 MG tablet, per last visit on 3/28/23 pt was referred back to PCP for ongoing care.    Your psychiatric care is being returned back to your primary care provider for ongoing management. Please reach out to your primary care provider with any questions or concerns about your mental health or mental health medications.

## 2023-07-25 NOTE — TELEPHONE ENCOUNTER
Hydroxyzine was last prescribed on 3/28/23 for a 90 day supply and 1 refill. Patient should have enough medication. Refill will be denied.     JOSEPHINE VU RN on 7/25/2023 at 11:09 AM

## 2023-08-26 ENCOUNTER — HEALTH MAINTENANCE LETTER (OUTPATIENT)
Age: 53
End: 2023-08-26

## 2023-09-22 ASSESSMENT — ENCOUNTER SYMPTOMS
NAUSEA: 0
SHORTNESS OF BREATH: 1
DYSURIA: 0
FREQUENCY: 0
ARTHRALGIAS: 0
DIARRHEA: 0
CONSTIPATION: 0
HEMATOCHEZIA: 0
MYALGIAS: 1
NERVOUS/ANXIOUS: 1
SORE THROAT: 0
FEVER: 0
ABDOMINAL PAIN: 0
HEARTBURN: 1
CHILLS: 0
PALPITATIONS: 0
COUGH: 0
PARESTHESIAS: 0
HEADACHES: 0
HEMATURIA: 0
JOINT SWELLING: 0
DIZZINESS: 0
WEAKNESS: 0
EYE PAIN: 0

## 2023-09-25 ENCOUNTER — OFFICE VISIT (OUTPATIENT)
Dept: FAMILY MEDICINE | Facility: CLINIC | Age: 53
End: 2023-09-25
Payer: COMMERCIAL

## 2023-09-25 VITALS
HEART RATE: 115 BPM | RESPIRATION RATE: 16 BRPM | TEMPERATURE: 97.9 F | DIASTOLIC BLOOD PRESSURE: 70 MMHG | WEIGHT: 278 LBS | HEIGHT: 74 IN | BODY MASS INDEX: 35.68 KG/M2 | SYSTOLIC BLOOD PRESSURE: 126 MMHG | OXYGEN SATURATION: 99 %

## 2023-09-25 DIAGNOSIS — Z78.9 HEPATITIS B IMMUNE: ICD-10-CM

## 2023-09-25 DIAGNOSIS — R79.89 LOW TESTOSTERONE IN MALE: ICD-10-CM

## 2023-09-25 DIAGNOSIS — Z00.00 ROUTINE GENERAL MEDICAL EXAMINATION AT A HEALTH CARE FACILITY: Primary | ICD-10-CM

## 2023-09-25 DIAGNOSIS — R53.83 FATIGUE, UNSPECIFIED TYPE: ICD-10-CM

## 2023-09-25 DIAGNOSIS — Z13.220 LIPID SCREENING: ICD-10-CM

## 2023-09-25 DIAGNOSIS — R79.89 ELEVATED SERUM CREATININE: ICD-10-CM

## 2023-09-25 DIAGNOSIS — Z13.1 SCREENING FOR DIABETES MELLITUS: ICD-10-CM

## 2023-09-25 DIAGNOSIS — E66.812 CLASS 2 OBESITY WITHOUT SERIOUS COMORBIDITY WITH BODY MASS INDEX (BMI) OF 35.0 TO 35.9 IN ADULT, UNSPECIFIED OBESITY TYPE: ICD-10-CM

## 2023-09-25 PROCEDURE — 99214 OFFICE O/P EST MOD 30 MIN: CPT | Mod: 25 | Performed by: FAMILY MEDICINE

## 2023-09-25 PROCEDURE — 99396 PREV VISIT EST AGE 40-64: CPT | Mod: 25 | Performed by: FAMILY MEDICINE

## 2023-09-25 ASSESSMENT — ENCOUNTER SYMPTOMS
DIZZINESS: 0
CONSTIPATION: 0
CHILLS: 0
HEADACHES: 0
COUGH: 0
NERVOUS/ANXIOUS: 1
HEMATOCHEZIA: 0
MYALGIAS: 1
HEARTBURN: 1
FREQUENCY: 0
PARESTHESIAS: 0
SHORTNESS OF BREATH: 1
ARTHRALGIAS: 0
ABDOMINAL PAIN: 0
EYE PAIN: 0
DIARRHEA: 0
FEVER: 0
SORE THROAT: 0
DYSURIA: 0
NAUSEA: 0
WEAKNESS: 0
HEMATURIA: 0
JOINT SWELLING: 0
PALPITATIONS: 0

## 2023-09-25 ASSESSMENT — PAIN SCALES - GENERAL: PAINLEVEL: NO PAIN (0)

## 2023-09-25 ASSESSMENT — ANXIETY QUESTIONNAIRES
GAD7 TOTAL SCORE: 20
5. BEING SO RESTLESS THAT IT IS HARD TO SIT STILL: MORE THAN HALF THE DAYS
2. NOT BEING ABLE TO STOP OR CONTROL WORRYING: NEARLY EVERY DAY
1. FEELING NERVOUS, ANXIOUS, OR ON EDGE: NEARLY EVERY DAY
3. WORRYING TOO MUCH ABOUT DIFFERENT THINGS: NEARLY EVERY DAY
6. BECOMING EASILY ANNOYED OR IRRITABLE: NEARLY EVERY DAY
GAD7 TOTAL SCORE: 20
7. FEELING AFRAID AS IF SOMETHING AWFUL MIGHT HAPPEN: NEARLY EVERY DAY
IF YOU CHECKED OFF ANY PROBLEMS ON THIS QUESTIONNAIRE, HOW DIFFICULT HAVE THESE PROBLEMS MADE IT FOR YOU TO DO YOUR WORK, TAKE CARE OF THINGS AT HOME, OR GET ALONG WITH OTHER PEOPLE: SOMEWHAT DIFFICULT

## 2023-09-25 ASSESSMENT — PATIENT HEALTH QUESTIONNAIRE - PHQ9
SUM OF ALL RESPONSES TO PHQ QUESTIONS 1-9: 10
5. POOR APPETITE OR OVEREATING: NEARLY EVERY DAY

## 2023-09-25 NOTE — PATIENT INSTRUCTIONS
Schedule fasting lab appointment.    You may get the Shingrix vaccine for shingles if you desire, and after you verify with insurance how they cover the vaccine.     Complete flu and covid-19 vaccinations in October 2023.    Be consistent with low trans fat and saturated fat diet.  Eat food rich in omega-3-fatty acids as you tolerate. (salmon, olive oil)  Eat 5 cups of vegetables, fruits and whole grains per day.  Limit starchy food (white rice, white bread, white pasta, white potatoes) to less than a cup per meal.  Minimize sweets, junk food and fastfood. Limit soda beverages to one serving per day; best to avoid it altogether though.  Exercise: moderate intensity sustained for at least 30 mins per episode, goal of 150 mins per week at least  Combine cardiovascular and resistance exercises.  These exercise recommendations are in addition to your daily activity at work or home.  Work on losing weight.    Reduce alcohol consumption to note more than 2 drinks per 24 hours, and try not to consume alcohol everyday.    Preventative Care Visits include: Yearly physicals, Well-child visits, Welcome to Medicare visits, & Medicare yearly wellness exams.    The purpose of these visits is to discuss your medical history and prevent health problems before you are sick.  You may need to pay a copay, coinsurance or deductible if your visit today includes testing or treating for a new or existing condition.    Additional charges may be incurred for today's visit. If you have questions about what your insurance plan covers, please contact your Insurance Company's member service department.  If you have questions specific to a bill you have already received from [facility], please contact the [employer] Billing Office at [phone].      Preventive Health Recommendations  Male Ages 50 - 64    Yearly exam:             See your health care provider every year in order to  o   Review health changes.   o   Discuss preventive care.    o    Review your medicines if your doctor has prescribed any.   Have a cholesterol test every 5 years, or more frequently if you are at risk for high cholesterol/heart disease.   Have a diabetes test (fasting glucose) every three years. If you are at risk for diabetes, you should have this test more often.   Have a colonoscopy at age 50, or have a yearly FIT test (stool test). These exams will check for colon cancer.    Talk with your health care provider about whether or not a prostate cancer screening test (PSA) is right for you.  You should be tested each year for STDs (sexually transmitted diseases), if you re at risk.     Shots: Get a flu shot each year. Get a tetanus shot every 10 years.     Nutrition:  Eat at least 5 servings of fruits and vegetables daily.   Eat whole-grain bread, whole-wheat pasta and brown rice instead of white grains and rice.   Get adequate Calcium and Vitamin D.     Lifestyle  Exercise for at least 150 minutes a week (30 minutes a day, 5 days a week). This will help you control your weight and prevent disease.   Limit alcohol to one drink per day.   No smoking.   Wear sunscreen to prevent skin cancer.   See your dentist every six months for an exam and cleaning.   See your eye doctor every 1 to 2 years.

## 2023-09-25 NOTE — PROGRESS NOTES
SUBJECTIVE:   CC: Jun is an 53 year old who presents for preventative health visit.       9/25/2023     2:10 PM   Additional Questions   Roomed by rmb   Accompanied by self         9/25/2023     2:10 PM   Patient Reported Additional Medications   Patient reports taking the following new medications none       Healthy Habits:     Getting at least 3 servings of Calcium per day:  Yes    Bi-annual eye exam:  Yes    Dental care twice a year:  Yes    Sleep apnea or symptoms of sleep apnea:  Daytime drowsiness and Excessive snoring    Diet:  Carbohydrate counting and Breakfast skipped    Frequency of exercise:  2-3 days/week    Duration of exercise:  15-30 minutes    Taking medications regularly:  Yes    Medication side effects:  None    Additional concerns today:  Yes    Patient reports fatigue, lack of motivation to do things, difficulty losing weight his mid-40's.  No previous evals.        Social History     Tobacco Use    Smoking status: Former     Packs/day: 0.50     Years: 5.00     Pack years: 2.50     Types: Cigarettes    Smokeless tobacco: Never   Vaping Use    Vaping Use: Never used   Substance Use Topics    Alcohol use: Yes     Comment: 5-6 drinks daily    Drug use: No         10/3/2022     4:44 PM 3/27/2023     1:40 PM 9/25/2023     2:16 PM   JULIENNE-7 SCORE   Total Score 17 (severe anxiety) 18 (severe anxiety)    Total Score 17 18    18 20         9/13/2022     7:13 AM 3/27/2023     1:36 PM 9/25/2023     2:16 PM   PHQ   PHQ-9 Total Score 16 13 10   Q9: Thoughts of better off dead/self-harm past 2 weeks Not at all Not at all Not at all         9/25/2023     2:16 PM   Last PHQ-9   1.  Little interest or pleasure in doing things 2   2.  Feeling down, depressed, or hopeless 1   3.  Trouble falling or staying asleep, or sleeping too much 2   4.  Feeling tired or having little energy 2   5.  Poor appetite or overeating 0   6.  Feeling bad about yourself 2   7.  Trouble concentrating 1   8.  Moving slowly or restless 0    Q9: Thoughts of better off dead/self-harm past 2 weeks 0   PHQ-9 Total Score 10   Difficulty at work, home, or with people Somewhat difficult         9/25/2023     2:16 PM   JULIENNE-7    1. Feeling nervous, anxious, or on edge 3   2. Not being able to stop or control worrying 3   3. Worrying too much about different things 3   4. Trouble relaxing 3   5. Being so restless that it is hard to sit still 2   6. Becoming easily annoyed or irritable 3   7. Feeling afraid, as if something awful might happen 3   JULIENNE-7 Total Score 20   If you checked any problems, how difficult have they made it for you to do your work, take care of things at home, or get along with other people? Somewhat difficult         Social History     Tobacco Use    Smoking status: Former     Packs/day: 0.50     Years: 5.00     Pack years: 2.50     Types: Cigarettes    Smokeless tobacco: Never   Substance Use Topics    Alcohol use: Yes     Comment: 5-6 drinks daily             9/22/2023    10:23 AM   Alcohol Use   Prescreen: >3 drinks/day or >7 drinks/week? Yes   AUDIT SCORE  9         9/22/2023    10:23 AM   AUDIT - Alcohol Use Disorders Identification Test - Reproduced from the World Health Organization Audit 2001 (Second Edition)   1.  How often do you have a drink containing alcohol? 4 or more times a week   2.  How many drinks containing alcohol do you have on a typical day when you are drinking? 5 or 6   3.  How often do you have five or more drinks on one occasion? Weekly   4.  How often during the last year have you found that you were not able to stop drinking once you had started? Never   5.  How often during the last year have you failed to do what was normally expected of you because of drinking? Never   6.  How often during the last year have you needed a first drink in the morning to get yourself going after a heavy drinking session? Never   7.  How often during the last year have you had a feeling of guilt or remorse after drinking? Never    8.  How often during the last year have you been unable to remember what happened the night before because of your drinking? Never   9.  Have you or someone else been injured because of your drinking? No   10. Has a relative, friend, doctor or other health care worker been concerned about your drinking or suggested you cut down? No   TOTAL SCORE 9       Last PSA: No results found for: PSA    Reviewed orders with patient. Reviewed health maintenance and updated orders accordingly - Yes  Patient Active Problem List   Diagnosis    S/P laparoscopic appendectomy    Hepatitis B immune    Class 2 obesity without serious comorbidity with body mass index (BMI) of 35.0 to 35.9 in adult, unspecified obesity type     Past Surgical History:   Procedure Laterality Date    GI SURGERY  06/25/2013    LAPAROSCOPIC APPENDECTOMY  06/25/2013    Procedure: LAPAROSCOPIC APPENDECTOMY;  herniorrphory;  Surgeon: Philly Lynn MD;  Location: [facility]       Social History     Tobacco Use    Smoking status: Former     Packs/day: 0.50     Years: 5.00     Pack years: 2.50     Types: Cigarettes    Smokeless tobacco: Never   Substance Use Topics    Alcohol use: Yes     Comment: 5-6 drinks daily     Family History   Problem Relation Age of Onset    Diabetes Father     Diabetes Maternal Grandfather     Diabetes Paternal Grandfather          Current Outpatient Medications   Medication Sig Dispense Refill    hydrOXYzine (ATARAX) 50 MG tablet Take 0.5-1 tablets (25-50 mg) by mouth 3 times daily as needed for anxiety 270 tablet 1    vitamin B complex with vitamin C (STRESS TAB) tablet Take 1 tablet by mouth daily       No Known Allergies    Reviewed and updated as needed this visit by clinical staff   Tobacco  Allergies  Meds              Reviewed and updated as needed this visit by Provider     Meds             Past Medical History:   Diagnosis Date    Acute appendicitis with peritoneal abscess 07/09/2013    Hypertension      "  Past Surgical History:   Procedure Laterality Date    GI SURGERY  06/25/2013    LAPAROSCOPIC APPENDECTOMY  06/25/2013    Procedure: LAPAROSCOPIC APPENDECTOMY;  herniorrphory;  Surgeon: Philly Lynn MD;  Location: [facility]       Review of Systems   Constitutional:  Negative for chills and fever.   HENT:  Positive for hearing loss. Negative for congestion, ear pain and sore throat.    Eyes:  Negative for pain and visual disturbance.   Respiratory:  Positive for shortness of breath. Negative for cough.    Cardiovascular:  Negative for chest pain, palpitations and peripheral edema.   Gastrointestinal:  Positive for heartburn. Negative for abdominal pain, constipation, diarrhea, hematochezia and nausea.   Genitourinary:  Negative for dysuria, frequency, genital sores, hematuria, impotence, penile discharge and urgency.   Musculoskeletal:  Positive for myalgias. Negative for arthralgias and joint swelling.   Skin:  Negative for rash.   Neurological:  Negative for dizziness, weakness, headaches and paresthesias.   Psychiatric/Behavioral:  Positive for mood changes. The patient is nervous/anxious.      Patient reports dyspnea improved since losing eight. But still occurs if walking long distances.    OBJECTIVE:   /70   Pulse 115   Temp 97.9  F (36.6  C) (Tympanic)   Resp 16   Ht 1.874 m (6' 1.78\")   Wt 126.1 kg (278 lb)   SpO2 99%   BMI 35.91 kg/m      Physical Exam  GENERAL APPEARANCE: obese,  alert and no distress  EYES: pink conj, no icterus, PERRL, EOMI  HENT: ear canals and TM's normal, nose and mouth without ulcers or lesions, oropharynx clear and oral mucous membranes moist  NECK: no adenopathy, no asymmetry, masses, or scars and thyroid normal to palpation  RESP: lungs clear to auscultation - no rales, rhonchi or wheezes  CV: regular rates and rhythm, normal S1 S2, no S3 or S4, no murmur, click or rub, no peripheral edema and peripheral pulses strong  ABDOMEN: soft, nontender, no " hepatosplenomegaly, no masses and bowel sounds normal  RECTAL: normal sphincter tone, no rectal masses, prostate slightly enlarged but smooth, soft and nontender  MS: no musculoskeletal defects are noted and gait is age appropriate without ataxia  SKIN: no suspicious lesions or rashes  NEURO: Normal strength and tone, sensory exam grossly normal, mentation intact and speech normal     Diagnostic Test Results:  none     ASSESSMENT/PLAN:   Jun was seen today for physical.    Diagnoses and all orders for this visit:    Routine general medical examination at a health care facility  Patient was advised on recommended screening and preventive health recommendations.  He verbalized understanding and agreed to the plans below.   Patient is tentative about covid19 booster. He will obtain flu shot from work.    Fatigue, unspecified type  -     CBC with Platelets & Differential; Future  -     Comprehensive metabolic panel; Future  -     TSH with free T4 reflex; Future  -     Testosterone, total; Future  -     OFFICE/OUTPT VISIT,EST,LEVL IV  Discussed Ddx: BRAYDEN, deconditioning, thyroid disease, metabolic disease, anemia, vascular disease.  Ordered above to rule out possible causes.  Recommended graduated exercise regimen.  Advised balanced and healthy diet.     Class 2 obesity without serious comorbidity with body mass index (BMI) of 35.0 to 35.9 in adult, unspecified obesity type  -     Comprehensive metabolic panel; Future  -     TSH with free T4 reflex; Future  If no underlying medical condition to correct, will consider medical weight management since patient has been unsuccessful with various lifestyle changes.  He will check with insurance about coverage for semaglutide or the like, or other meds for weight loss.    Lipid screening  -     Lipid panel reflex to direct LDL Fasting; Future    Screening for diabetes mellitus  -     Comprehensive metabolic panel; Future    Hepatitis B immune  Patient said he got his hep B  "vaccine series before he hired many years ago.    Other orders  -     REVIEW OF HEALTH MAINTENANCE PROTOCOL ORDERS        Patient has been advised of split billing requirements and indicates understanding: Yes      COUNSELING:   Reviewed preventive health counseling, as reflected in patient instructions      BMI:   Estimated body mass index is 35.91 kg/m  as calculated from the following:    Height as of this encounter: 1.874 m (6' 1.78\").    Weight as of this encounter: 126.1 kg (278 lb).   Weight management plan: Discussed healthy diet and exercise guidelines      He reports that he has quit smoking. His smoking use included cigarettes. He has a 2.50 pack-year smoking history. He has never used smokeless tobacco.            Kahlil Denny MD  [facility]  "

## 2023-10-03 ENCOUNTER — LAB (OUTPATIENT)
Dept: LAB | Facility: CLINIC | Age: 53
End: 2023-10-03
Payer: COMMERCIAL

## 2023-10-03 DIAGNOSIS — Z13.1 SCREENING FOR DIABETES MELLITUS: ICD-10-CM

## 2023-10-03 DIAGNOSIS — E66.812 CLASS 2 OBESITY WITHOUT SERIOUS COMORBIDITY WITH BODY MASS INDEX (BMI) OF 35.0 TO 35.9 IN ADULT, UNSPECIFIED OBESITY TYPE: ICD-10-CM

## 2023-10-03 DIAGNOSIS — R53.83 FATIGUE, UNSPECIFIED TYPE: ICD-10-CM

## 2023-10-03 DIAGNOSIS — Z13.220 LIPID SCREENING: ICD-10-CM

## 2023-10-03 LAB
ALBUMIN SERPL BCG-MCNC: 4.5 G/DL (ref 3.5–5.2)
ALP SERPL-CCNC: 103 U/L (ref 40–129)
ALT SERPL W P-5'-P-CCNC: 39 U/L (ref 0–70)
ANION GAP SERPL CALCULATED.3IONS-SCNC: 13 MMOL/L (ref 7–15)
AST SERPL W P-5'-P-CCNC: 33 U/L (ref 0–45)
BASO+EOS+MONOS # BLD AUTO: NORMAL 10*3/UL
BASO+EOS+MONOS NFR BLD AUTO: NORMAL %
BASOPHILS # BLD AUTO: 0 10E3/UL (ref 0–0.2)
BASOPHILS NFR BLD AUTO: 1 %
BILIRUB SERPL-MCNC: 0.3 MG/DL
BUN SERPL-MCNC: 17.7 MG/DL (ref 6–20)
CALCIUM SERPL-MCNC: 9.3 MG/DL (ref 8.6–10)
CHLORIDE SERPL-SCNC: 105 MMOL/L (ref 98–107)
CHOLEST SERPL-MCNC: 203 MG/DL
CREAT SERPL-MCNC: 1.2 MG/DL (ref 0.67–1.17)
DEPRECATED HCO3 PLAS-SCNC: 23 MMOL/L (ref 22–29)
EGFRCR SERPLBLD CKD-EPI 2021: 73 ML/MIN/1.73M2
EOSINOPHIL # BLD AUTO: 0.1 10E3/UL (ref 0–0.7)
EOSINOPHIL NFR BLD AUTO: 2 %
ERYTHROCYTE [DISTWIDTH] IN BLOOD BY AUTOMATED COUNT: 11.6 % (ref 10–15)
GLUCOSE SERPL-MCNC: 89 MG/DL (ref 70–99)
HCT VFR BLD AUTO: 40.5 % (ref 40–53)
HDLC SERPL-MCNC: 56 MG/DL
HGB BLD-MCNC: 13.9 G/DL (ref 13.3–17.7)
IMM GRANULOCYTES # BLD: 0 10E3/UL
IMM GRANULOCYTES NFR BLD: 0 %
LDLC SERPL CALC-MCNC: 105 MG/DL
LYMPHOCYTES # BLD AUTO: 1.8 10E3/UL (ref 0.8–5.3)
LYMPHOCYTES NFR BLD AUTO: 36 %
MCH RBC QN AUTO: 31 PG (ref 26.5–33)
MCHC RBC AUTO-ENTMCNC: 34.3 G/DL (ref 31.5–36.5)
MCV RBC AUTO: 90 FL (ref 78–100)
MONOCYTES # BLD AUTO: 0.6 10E3/UL (ref 0–1.3)
MONOCYTES NFR BLD AUTO: 13 %
NEUTROPHILS # BLD AUTO: 2.4 10E3/UL (ref 1.6–8.3)
NEUTROPHILS NFR BLD AUTO: 48 %
NONHDLC SERPL-MCNC: 147 MG/DL
PLATELET # BLD AUTO: 170 10E3/UL (ref 150–450)
POTASSIUM SERPL-SCNC: 4.2 MMOL/L (ref 3.4–5.3)
PROT SERPL-MCNC: 7 G/DL (ref 6.4–8.3)
RBC # BLD AUTO: 4.49 10E6/UL (ref 4.4–5.9)
SODIUM SERPL-SCNC: 141 MMOL/L (ref 135–145)
TRIGL SERPL-MCNC: 209 MG/DL
TSH SERPL DL<=0.005 MIU/L-ACNC: 3.01 UIU/ML (ref 0.3–4.2)
WBC # BLD AUTO: 5.1 10E3/UL (ref 4–11)

## 2023-10-03 PROCEDURE — 36415 COLL VENOUS BLD VENIPUNCTURE: CPT

## 2023-10-03 PROCEDURE — 80053 COMPREHEN METABOLIC PANEL: CPT

## 2023-10-03 PROCEDURE — 80061 LIPID PANEL: CPT

## 2023-10-03 PROCEDURE — 84403 ASSAY OF TOTAL TESTOSTERONE: CPT

## 2023-10-03 PROCEDURE — 84443 ASSAY THYROID STIM HORMONE: CPT

## 2023-10-03 PROCEDURE — 85025 COMPLETE CBC W/AUTO DIFF WBC: CPT

## 2023-10-05 LAB — TESTOST SERPL-MCNC: 221 NG/DL (ref 240–950)

## 2023-10-24 ENCOUNTER — LAB (OUTPATIENT)
Dept: LAB | Facility: CLINIC | Age: 53
End: 2023-10-24
Payer: COMMERCIAL

## 2023-10-24 DIAGNOSIS — R79.89 ELEVATED SERUM CREATININE: ICD-10-CM

## 2023-10-24 DIAGNOSIS — R79.89 LOW TESTOSTERONE IN MALE: ICD-10-CM

## 2023-10-24 DIAGNOSIS — R53.83 FATIGUE, UNSPECIFIED TYPE: ICD-10-CM

## 2023-10-24 LAB
CREAT SERPL-MCNC: 1.25 MG/DL (ref 0.67–1.17)
EGFRCR SERPLBLD CKD-EPI 2021: 69 ML/MIN/1.73M2
SHBG SERPL-SCNC: 30 NMOL/L (ref 11–80)

## 2023-10-24 PROCEDURE — 84270 ASSAY OF SEX HORMONE GLOBUL: CPT

## 2023-10-24 PROCEDURE — 82565 ASSAY OF CREATININE: CPT

## 2023-10-24 PROCEDURE — 36415 COLL VENOUS BLD VENIPUNCTURE: CPT

## 2023-10-24 PROCEDURE — 84403 ASSAY OF TOTAL TESTOSTERONE: CPT

## 2023-10-26 LAB
TESTOST FREE SERPL-MCNC: 5.45 NG/DL
TESTOST SERPL-MCNC: 265 NG/DL (ref 240–950)

## 2023-10-27 DIAGNOSIS — R79.89 ELEVATED SERUM CREATININE: Primary | ICD-10-CM

## 2023-11-21 ENCOUNTER — IMMUNIZATION (OUTPATIENT)
Dept: FAMILY MEDICINE | Facility: CLINIC | Age: 53
End: 2023-11-21
Payer: COMMERCIAL

## 2023-11-21 PROCEDURE — 90686 IIV4 VACC NO PRSV 0.5 ML IM: CPT

## 2023-11-21 PROCEDURE — 90471 IMMUNIZATION ADMIN: CPT

## 2023-11-21 NOTE — PROGRESS NOTES
Prior to immunization administration, verified patients identity using patient s name and date of birth. Please see Immunization Activity for additional information.     Screening Questionnaire for Adult Immunization    Are you sick today?   No   Do you have allergies to medications, food, a vaccine component or latex?   No   Have you ever had a serious reaction after receiving a vaccination?   No   Do you have a long-term health problem with heart, lung, kidney, or metabolic disease (e.g., diabetes), asthma, a blood disorder, no spleen, complement component deficiency, a cochlear implant, or a spinal fluid leak?  Are you on long-term aspirin therapy?   No   Do you have cancer, leukemia, HIV/AIDS, or any other immune system problem?   No   Do you have a parent, brother, or sister with an immune system problem?   No   In the past 3 months, have you taken medications that affect  your immune system, such as prednisone, other steroids, or anticancer drugs; drugs for the treatment of rheumatoid arthritis, Crohn s disease, or psoriasis; or have you had radiation treatments?   No   Have you had a seizure, or a brain or other nervous system problem?   No   During the past year, have you received a transfusion of blood or blood    products, or been given immune (gamma) globulin or antiviral drug?   No   For women: Are you pregnant or is there a chance you could become       pregnant during the next month?   No   Have you received any vaccinations in the past 4 weeks?   No     Immunization questionnaire answers were all negative.    I have reviewed the following standing orders:   This patient is due and qualifies for the Influenza vaccine.    Click here for Influenza Vaccine Standing Order    I have reviewed the vaccines inclusion and exclusion criteria; No concerns regarding eligibility.     Patient instructed to remain in clinic for 15 minutes afterwards, and to report any adverse reactions.     Screening performed by  Loyda Jaime MA on 11/21/2023 at 12:52 PM.

## 2024-03-19 ENCOUNTER — TRANSFERRED RECORDS (OUTPATIENT)
Dept: HEALTH INFORMATION MANAGEMENT | Facility: CLINIC | Age: 54
End: 2024-03-19
Payer: COMMERCIAL

## 2024-03-27 ENCOUNTER — TRANSFERRED RECORDS (OUTPATIENT)
Dept: HEALTH INFORMATION MANAGEMENT | Facility: CLINIC | Age: 54
End: 2024-03-27
Payer: COMMERCIAL

## 2024-04-23 ENCOUNTER — OFFICE VISIT (OUTPATIENT)
Dept: FAMILY MEDICINE | Facility: CLINIC | Age: 54
End: 2024-04-23
Payer: OTHER MISCELLANEOUS

## 2024-04-23 VITALS
HEART RATE: 102 BPM | DIASTOLIC BLOOD PRESSURE: 88 MMHG | SYSTOLIC BLOOD PRESSURE: 144 MMHG | BODY MASS INDEX: 38.37 KG/M2 | OXYGEN SATURATION: 98 % | HEIGHT: 74 IN | RESPIRATION RATE: 20 BRPM | WEIGHT: 299 LBS | TEMPERATURE: 97.2 F

## 2024-04-23 DIAGNOSIS — R03.0 PREHYPERTENSION: ICD-10-CM

## 2024-04-23 DIAGNOSIS — Z01.818 PREOP GENERAL PHYSICAL EXAM: Primary | ICD-10-CM

## 2024-04-23 DIAGNOSIS — E78.2 MIXED HYPERLIPIDEMIA: ICD-10-CM

## 2024-04-23 DIAGNOSIS — E66.812 CLASS 2 OBESITY WITHOUT SERIOUS COMORBIDITY WITH BODY MASS INDEX (BMI) OF 35.0 TO 35.9 IN ADULT, UNSPECIFIED OBESITY TYPE: ICD-10-CM

## 2024-04-23 DIAGNOSIS — R79.89 ELEVATED SERUM CREATININE: ICD-10-CM

## 2024-04-23 DIAGNOSIS — S46.011A TRAUMATIC TEAR OF RIGHT ROTATOR CUFF, UNSPECIFIED TEAR EXTENT, INITIAL ENCOUNTER: ICD-10-CM

## 2024-04-23 DIAGNOSIS — F10.21 ALCOHOL DEPENDENCE IN REMISSION (H): ICD-10-CM

## 2024-04-23 LAB
ANION GAP SERPL CALCULATED.3IONS-SCNC: 12 MMOL/L (ref 7–15)
BUN SERPL-MCNC: 18.6 MG/DL (ref 6–20)
CALCIUM SERPL-MCNC: 9.6 MG/DL (ref 8.6–10)
CHLORIDE SERPL-SCNC: 102 MMOL/L (ref 98–107)
CREAT SERPL-MCNC: 1.05 MG/DL (ref 0.67–1.17)
DEPRECATED HCO3 PLAS-SCNC: 26 MMOL/L (ref 22–29)
EGFRCR SERPLBLD CKD-EPI 2021: 85 ML/MIN/1.73M2
GLUCOSE SERPL-MCNC: 104 MG/DL (ref 70–99)
POTASSIUM SERPL-SCNC: 4.6 MMOL/L (ref 3.4–5.3)
SODIUM SERPL-SCNC: 140 MMOL/L (ref 135–145)

## 2024-04-23 PROCEDURE — 80048 BASIC METABOLIC PNL TOTAL CA: CPT | Performed by: FAMILY MEDICINE

## 2024-04-23 PROCEDURE — 36415 COLL VENOUS BLD VENIPUNCTURE: CPT | Performed by: FAMILY MEDICINE

## 2024-04-23 PROCEDURE — 99214 OFFICE O/P EST MOD 30 MIN: CPT | Performed by: FAMILY MEDICINE

## 2024-04-23 ASSESSMENT — PAIN SCALES - GENERAL: PAINLEVEL: MILD PAIN (2)

## 2024-04-23 NOTE — PATIENT INSTRUCTIONS
Preparing for Your Surgery  Getting started  A nurse will call you to review your health history and instructions. They will give you an arrival time based on your scheduled surgery time. Please be ready to share:  Your doctor's clinic name and phone number  Your medical, surgical, and anesthesia history  A list of allergies and sensitivities  A list of medicines, including herbal treatments and over-the-counter drugs  Whether the patient has a legal guardian (ask how to send us the papers in advance)  Please tell us if you're pregnant--or if there's any chance you might be pregnant. Some surgeries may injure a fetus (unborn baby), so they require a pregnancy test. Surgeries that are safe for a fetus don't always need a test, and you can choose whether to have one.   If you have a child who's having surgery, please ask for a copy of Preparing for Your Child's Surgery.    Preparing for surgery  Within 10 to 30 days of surgery: Have a pre-op exam (sometimes called an H&P, or History and Physical). This can be done at a clinic or pre-operative center.  If you're having a , you may not need this exam. Talk to your care team.  At your pre-op exam, talk to your care team about all medicines you take. If you need to stop any medicines before surgery, ask when to start taking them again.  We do this for your safety. Many medicines can make you bleed too much during surgery. Some change how well surgery (anesthesia) drugs work.  Call your insurance company to let them know you're having surgery. (If you don't have insurance, call [phone].)  Call your clinic if there's any change in your health. This includes signs of a cold or flu (sore throat, runny nose, cough, rash, fever). It also includes a scrape or scratch near the surgery site.  If you have questions on the day of surgery, call your hospital or surgery center.  Eating and drinking guidelines  For your safety: Unless your surgeon tells you otherwise,  follow the guidelines below.  Eat and drink as usual until 8 hours before you arrive for surgery. After that, no food or milk.  Drink clear liquids until 2 hours before you arrive. These are liquids you can see through, like water, Gatorade, and Propel Water. They also include plain black coffee and tea (no cream or milk), candy, and breath mints. You can spit out gum when you arrive.  If you drink alcohol: Stop drinking it the night before surgery.  If your care team tells you to take medicine on the morning of surgery, it's okay to take it with a sip of water.  Preventing infection  Shower or bathe the night before and morning of your surgery. Follow the instructions your clinic gave you. (If no instructions, use regular soap.)  Don't shave or clip hair near your surgery site. We'll remove the hair if needed.  Don't smoke or vape the morning of surgery. You may chew nicotine gum up to 2 hours before surgery. A nicotine patch is okay.  Note: Some surgeries require you to completely quit smoking and nicotine. Check with your surgeon.  Your care team will make every effort to keep you safe from infection. We will:  Clean our hands often with soap and water (or an alcohol-based hand rub).  Clean the skin at your surgery site with a special soap that kills germs.  Give you a special gown to keep you warm. (Cold raises the risk of infection.)  Wear special hair covers, masks, gowns and gloves during surgery.  Give antibiotic medicine, if prescribed. Not all surgeries need antibiotics.  What to bring on the day of surgery  Photo ID and insurance card  Copy of your health care directive, if you have one  Glasses and hearing aids (bring cases)  You can't wear contacts during surgery  Inhaler and eye drops, if you use them (tell us about these when you arrive)  CPAP machine or breathing device, if you use them  A few personal items, if spending the night  If you have . . .  A pacemaker, ICD (cardiac defibrillator) or other  implant: Bring the ID card.  An implanted stimulator: Bring the remote control.  A legal guardian: Bring a copy of the certified (court-stamped) guardianship papers.  Please remove any jewelry, including body piercings. Leave jewelry and other valuables at home.  If you're going home the day of surgery  You must have a responsible adult drive you home. They should stay with you overnight as well.  If you don't have someone to stay with you, and you aren't safe to go home alone, we may keep you overnight. Insurance often won't pay for this.  After surgery  If it's hard to control your pain or you need more pain medicine, please call your surgeon's office.  Questions?   If you have any questions for your care team, list them here: _________________________________________________________________________________________________________________________________________________________________________ ____________________________________ ____________________________________ ____________________________________  For informational purposes only. Not to replace the advice of your health care provider. Copyright   2003, 2019 [employer]. All rights reserved. Clinically reviewed by Kasie Graves MD. [employer] 343133 - REV 12/22.    How to Take Your Medication Before Surgery  Do not take Ibuprofen, Aspirin or Naproxen from now until after procedure.  If you need to take something for pain, take Acetaminophen 325 mg orally 1-2 tabs every 4-6 hrs as needed for pain     You will be contacted in 1-2 days for results of your lab tests.

## 2024-04-23 NOTE — PROGRESS NOTES
Preoperative Evaluation  [facility]  [address] [address]  Phone: [phone]  Primary Provider: No Ref-Primary, Physician  Pre-op Performing Provider: DIANA HOPE  Apr 23, 2024       Jun is a 54 year old, presenting for the following:  Pre-Op Exam        4/23/2024    12:40 PM   Additional Questions   Roomed by Dionne LEMUS MA   Accompanied by Self         4/23/2024   Forms   Any forms needing to be completed Yes         4/23/2024    12:40 PM   Patient Reported Additional Medications   Patient reports taking the following new medications esomeprazole unknown dose one time daily     Surgical Information  Surgery/Procedure: Right rotator cuff repair  Surgery Location: [facility] in [place]   Surgeon: Dr Riavs   Surgery Date: 5/3/24  Time of Surgery: TBD  Where patient plans to recover: At home with family  Fax number for surgical facility: [phone]    Assessment & Plan     The proposed surgical procedure is considered INTERMEDIATE risk.    Preop general physical exam    Traumatic tear of right rotator cuff, unspecified tear extent, initial encounter    Elevated serum creatinine  Repeat BMP today showed normal creatinine now.  Push oral fluids. Avoid nephrotoxins  - Basic metabolic panel  - Basic metabolic panel    Mixed hyperlipidemia  Reinforced heart healthy lifestyle.     Class 2 obesity without serious comorbidity with body mass index (BMI) of 35.0 to 35.9 in adult, unspecified obesity type  Increases complexity of management of the above chronic conditions and increases perioperative complications.    Alcohol dependence in remission (H)  Patient verifies he still consumes alcohol but not daily.    Prehypertension  Recheck of BP was in prehypertensive range.  Recheck on wellness exam.  Advised sodium restrictions.              - No identified additional risk factors other than previously addressed    Antiplatelet or  Anticoagulation Medication Instructions   - Patient is on no antiplatelet or anticoagulation medications.    Additional Medication Instructions  Patient is on no additional chronic medications    Recommendation  APPROVAL GIVEN to proceed with proposed procedure, without further diagnostic evaluation.      Subjective       HPI related to upcoming procedure: Patient has right rotator cuff tear from a work injury. Hence, planned surgery. Reviewed above with patient.         4/16/2024     9:31 AM   Preop Questions   1. Have you ever had a heart attack or stroke? No   2. Have you ever had surgery on your heart or blood vessels, such as a stent placement, a coronary artery bypass, or surgery on an artery in your head, neck, heart, or legs? No   3. Do you have chest pain with activity? No   4. Do you have a history of  heart failure? No   5. Do you currently have a cold, bronchitis or symptoms of other infection? No   6. Do you have a cough, shortness of breath, or wheezing? No   7. Do you or anyone in your family have previous history of blood clots? No   8. Do you or does anyone in your family have a serious bleeding problem such as prolonged bleeding following surgeries or cuts? No   9. Have you ever had problems with anemia or been told to take iron pills? No   10. Have you had any abnormal blood loss such as black, tarry or bloody stools? No   11. Have you ever had a blood transfusion? No   12. Are you willing to have a blood transfusion if it is medically needed before, during, or after your surgery? Yes   13. Have you or any of your relatives ever had problems with anesthesia? No   14. Do you have sleep apnea, excessive snoring or daytime drowsiness? No   15. Do you have any artifical heart valves or other implanted medical devices like a pacemaker, defibrillator, or continuous glucose monitor? No   16. Do you have artificial joints? No   17. Are you allergic to latex? No       Health Care Directive  Patient does  not have a Health Care Directive or Living Will: Discussed advance care planning with patient; however, patient declined at this time.    Preoperative Review of    reviewed - no record of controlled substances prescribed.    Status of Chronic Conditions:  See problem list for active medical problems.  Problems all longstanding and stable, except as noted/documented.  See ROS for pertinent symptoms related to these conditions.    HYPERLIPIDEMIA - Patient has a long history of significant Hyperlipidemia requiring medication for treatment with recent fair control. Patient reports no problems or side effects with the medication.     RENAL INSUFFICIENCY - Patient has a longstanding history of moderate-severe chronic renal insufficiency. Last Cr borderline high.     Patient Active Problem List    Diagnosis Date Noted    Alcohol dependence in remission (H) 04/23/2024     Priority: Medium    Hepatitis B immune 09/25/2023     Priority: Medium    Class 2 obesity without serious comorbidity with body mass index (BMI) of 35.0 to 35.9 in adult, unspecified obesity type 09/25/2023     Priority: Medium    S/P laparoscopic appendectomy 06/25/2013     Priority: Medium      Past Medical History:   Diagnosis Date    Acute appendicitis with peritoneal abscess 07/09/2013    Hypertension      Past Surgical History:   Procedure Laterality Date    GI SURGERY  06/25/2013    LAPAROSCOPIC APPENDECTOMY  06/25/2013    Procedure: LAPAROSCOPIC APPENDECTOMY;  herniorrphory;  Surgeon: Philly Lynn MD;  Location: [facility] OR    ORTHOPEDIC SURGERY      Torn meniscus repair laproscopic     No current outpatient medications on file.       No Known Allergies     Social History     Tobacco Use    Smoking status: Former     Current packs/day: 0.50     Average packs/day: 0.5 packs/day for 5.0 years (2.5 ttl pk-yrs)     Types: Cigarettes    Smokeless tobacco: Never   Substance Use Topics    Alcohol use: Yes     Comment: 5-6 drinks  "daily   Patient has been consuming alcohol only on some nights.    Family History   Problem Relation Age of Onset    Diabetes Father     Diabetes Maternal Grandfather     Diabetes Paternal Grandfather      History   Drug Use No         Review of Systems    Review of Systems  Constitutional, HEENT, cardiovascular, pulmonary, GI, , musculoskeletal, neuro, skin, endocrine and psych systems are negative, except as otherwise noted.    Objective    BP (!) 144/88 (BP Location: Right arm, Patient Position: Sitting, Cuff Size: Adult Large)   Pulse 102   Temp 97.2  F (36.2  C) (Tympanic)   Resp 20   Ht 1.88 m (6' 2\")   Wt 135.6 kg (299 lb)   SpO2 98%   BMI 38.39 kg/m     Estimated body mass index is 38.39 kg/m  as calculated from the following:    Height as of this encounter: 1.88 m (6' 2\").    Weight as of this encounter: 135.6 kg (299 lb).  Physical Exam  GENERAL APPEARANCE: obese, alert and no distress; ambulatory w/o assist  EYES: pink conj, no icterus, PERRL, EOMI  HENT: ear canals and TM's normal, nose and mouth without ulcers or lesions, oropharynx clear and oral mucous membranes moist  NECK: no adenopathy, no asymmetry, masses, or scars and thyroid normal to palpation  RESP: lungs clear to auscultation - no rales, rhonchi or wheezes  CV: regular rates and rhythm, normal S1 S2, no S3 or S4, no murmur, click or rub, no peripheral edema and peripheral pulses strong  ABDOMEN: soft, nontender, no hepatosplenomegaly, no masses and bowel sounds normal  MS: no musculoskeletal defects are noted and gait is age appropriate without ataxia  SKIN: good turgor, no rash/jaundice/ecchymosis  NEURO: Normal strength and tone, sensory exam grossly normal, mentation intact and speech normal     Recent Labs   Lab Test 10/24/23  0753 10/03/23  0805   HGB  --  13.9   PLT  --  170   NA  --  141   POTASSIUM  --  4.2   CR 1.25* 1.20*        Diagnostics  Recent Results (from the past 24 hour(s))   Basic metabolic panel    Collection " Time: 04/23/24  1:16 PM   Result Value Ref Range    Sodium 140 135 - 145 mmol/L    Potassium 4.6 3.4 - 5.3 mmol/L    Chloride 102 98 - 107 mmol/L    Carbon Dioxide (CO2) 26 22 - 29 mmol/L    Anion Gap 12 7 - 15 mmol/L    Urea Nitrogen 18.6 6.0 - 20.0 mg/dL    Creatinine 1.05 0.67 - 1.17 mg/dL    GFR Estimate 85 >60 mL/min/1.73m2    Calcium 9.6 8.6 - 10.0 mg/dL    Glucose 104 (H) 70 - 99 mg/dL      No EKG required, no history of coronary heart disease, significant arrhythmia, peripheral arterial disease or other structural heart disease.    Revised Cardiac Risk Index (RCRI)  The patient has the following serious cardiovascular risks for perioperative complications:   - No serious cardiac risks = 0 points     RCRI Interpretation: 0 points: Class I (very low risk - 0.4% complication rate)         Signed Electronically by: Kahlil Denny MD  Copy of this evaluation report is provided to requesting physician.

## 2024-05-03 ENCOUNTER — TRANSFERRED RECORDS (OUTPATIENT)
Dept: HEALTH INFORMATION MANAGEMENT | Facility: CLINIC | Age: 54
End: 2024-05-03
Payer: COMMERCIAL

## 2024-05-17 ENCOUNTER — TRANSFERRED RECORDS (OUTPATIENT)
Dept: HEALTH INFORMATION MANAGEMENT | Facility: CLINIC | Age: 54
End: 2024-05-17
Payer: COMMERCIAL

## 2024-06-14 ENCOUNTER — TRANSFERRED RECORDS (OUTPATIENT)
Dept: HEALTH INFORMATION MANAGEMENT | Facility: CLINIC | Age: 54
End: 2024-06-14
Payer: COMMERCIAL

## 2024-06-20 ENCOUNTER — TELEPHONE (OUTPATIENT)
Dept: FAMILY MEDICINE | Facility: CLINIC | Age: 54
End: 2024-06-20
Payer: COMMERCIAL

## 2024-06-20 ENCOUNTER — NURSE TRIAGE (OUTPATIENT)
Dept: FAMILY MEDICINE | Facility: CLINIC | Age: 54
End: 2024-06-20
Payer: COMMERCIAL

## 2024-06-20 NOTE — TELEPHONE ENCOUNTER
Reason for Call:  Appointment Request    Patient requesting this type of appt:  office visit    Requested provider:  Anyone at the [facility]     Reason patient unable to be scheduled: Not within requested timeframe    When does patient want to be seen/preferred time: 1-2 days    Comments: patient is wanting to be seen with anyone at the [facility] within the next couple days for shortness of breath upon exertion- declined talking with RN, but would like to be seen soon.     Could we send this information to you in [employer] or would you prefer to receive a phone call?:   Patient would like to be contacted via [employer]    Call taken on 6/20/2024 at 3:51 PM by Luz Maria Landry

## 2024-06-20 NOTE — TELEPHONE ENCOUNTER
"Reason for Disposition   Patient wants to be seen    Additional Information   Negative: SEVERE difficulty breathing (e.g., struggling for each breath, speaks in single words, pulse > 120)   Negative: Breathing stopped and hasn't returned   Negative: Choking on something   Negative: Bluish (or gray) lips or face   Negative: Difficult to awaken or acting confused (e.g., disoriented, slurred speech)   Negative: Passed out (i.e., fainted, collapsed and was not responding)   Negative: Wheezing started suddenly after medicine, an allergic food, or bee sting   Negative: Stridor (harsh sound while breathing in)   Negative: Slow, shallow and weak breathing   Negative: Sounds like a life-threatening emergency to the triager   Negative: Chest pain   Negative: Wheezing (high pitched whistling sound) and previous asthma attacks or use of asthma medicines   Negative: Difficulty breathing and within 14 days of COVID-19 Exposure   Negative: Difficulty breathing and only present when coughing   Negative: Difficulty breathing and only from stuffy nose   Negative: Difficulty breathing and only from stuffy nose or runny nose from common cold   Negative: MODERATE difficulty breathing (e.g., speaks in phrases, SOB even at rest, pulse 100-120) of new-onset or worse than normal   Negative: Oxygen level (e.g., pulse oximetry) 90% or lower   Negative: Wheezing can be heard across the room   Negative: Drooling or spitting out saliva (because can't swallow)   Negative: Any history of prior \"blood clot\" in leg or lungs   Negative: Illness requiring prolonged bedrest in past month (e.g., immobilization, long hospital stay)   Negative: Hip or leg fracture (broken bone) in past month (or had cast on leg or ankle in past month)   Negative: Major surgery in the past month   Negative: Long-distance travel in past month (e.g., car, bus, train, plane; with trip lasting 6 or more hours)   Negative: Cancer treatment in past six months (or has cancer " "now)   Negative: Extra heartbeats, irregular heart beating, or heart is beating very fast (i.e., 'palpitations')   Negative: Fever > 103 F (39.4 C)   Negative: Fever > 101 F (38.3 C) and over 60 years of age   Negative: Fever > 100.0 F (37.8 C) and bedridden (e.g., nursing home patient, stroke, chronic illness, recovering from surgery)   Negative: Fever > 100.0 F (37.8 C) and diabetes mellitus or weak immune system (e.g., HIV positive, cancer chemo, splenectomy, organ transplant, chronic steroids)   Negative: Periods where breathing stops and then resumes normally and bedridden (e.g., nursing home patient, CVA)   Negative: Pregnant or postpartum (from 0 to 6 weeks after delivery)   Negative: Patient sounds very sick or weak to the triager   Negative: MILD difficulty breathing (e.g., minimal/no SOB at rest, SOB with walking, pulse < 100) of new-onset or worse than normal   Negative: Longstanding difficulty breathing (e.g., CHF, COPD, emphysema) and worse than normal   Negative: Longstanding difficulty breathing and not responding to usual therapy   Negative: Continuous (nonstop) coughing    Answer Assessment - Initial Assessment Questions  1. RESPIRATORY STATUS: \"Describe your breathing?\" (e.g., wheezing, shortness of breath, unable to speak, severe coughing)       On an off sob with exertion since rotator surgery  2. ONSET: \"When did this breathing problem begin?\"       5-3  3. PATTERN \"Does the difficult breathing come and go, or has it been constant since it started?\"       Comes and goes with exertion  4. SEVERITY: \"How bad is your breathing?\" (e.g., mild, moderate, severe)     - MILD: No SOB at rest, mild SOB with walking, speaks normally in sentences, can lie down, no retractions, pulse < 100.     - MODERATE: SOB at rest, SOB with minimal exertion and prefers to sit, cannot lie down flat, speaks in phrases, mild retractions, audible wheezing, pulse 100-120.     - SEVERE: Very SOB at rest, speaks in single " "words, struggling to breathe, sitting hunched forward, retractions, pulse > 120       moderate  5. RECURRENT SYMPTOM: \"Have you had difficulty breathing before?\" If Yes, ask: \"When was the last time?\" and \"What happened that time?\"       no  6. CARDIAC HISTORY: \"Do you have any history of heart disease?\" (e.g., heart attack, angina, bypass surgery, angioplasty)       no  7. LUNG HISTORY: \"Do you have any history of lung disease?\"  (e.g., pulmonary embolus, asthma, emphysema)      no  8. CAUSE: \"What do you think is causing the breathing problem?\"       Side effect from anesthesia  9. OTHER SYMPTOMS: \"Do you have any other symptoms? (e.g., dizziness, runny nose, cough, chest pain, fever)        10. O2 SATURATION MONITOR:  \"Do you use an oxygen saturation monitor (pulse oximeter) at home?\" If Yes, ask: \"What is your reading (oxygen level) today?\" \"What is your usual oxygen saturation reading?\" (e.g., 95%)        no  11. PREGNANCY: \"Is there any chance you are pregnant?\" \"When was your last menstrual period?\"        no  12. TRAVEL: \"Have you traveled out of the country in the last month?\" (e.g., travel history, exposures)        no    Protocols used: Breathing Difficulty-A-[place]    "

## 2024-06-20 NOTE — TELEPHONE ENCOUNTER
Dr Denny  Pt calls and reports he has had on/off sob with exertion since his rotator cuff surgery on 5/3. He states it feels like he cannot get a good breath when he is exerting himself but other times he can be exercising and not be short of breath as well. He reports sub sternal burning that pt feels is in his lungs and the burning radiates to his back.   Denies cough, fever or any other symptoms.     Pt feels this might be related to his sedation during the surgery which he reports can cause paralysis in his diaphragm.     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 Would like to be seen by you. Offered pt appts as soon as tomorrow but he is not able to be seen until 6/25 due to scheduling conflicts. Placed pt on your schedule and pt was advised to be seen with any worsening or new symptoms    Sending to you as FYI in case you would like to recommend another disposition.       Balta Singer RN

## 2024-06-20 NOTE — TELEPHONE ENCOUNTER
Patient has had recent surgery and now has ZUNIGA and chest pain. Need to rule out pulmonary embolism. He needs to be seen in the ER.

## 2024-06-20 NOTE — TELEPHONE ENCOUNTER
Dr Denny  Called pt and he refuses to go to ER. He feels that if this was a PE it  would not be intermittent and also  he does not want to wait in the ER and deal with the cost and insurance. We discussed [facility] and pt would be willing to do that but not until Monday or Tuesday. I will send a referral to [facility] to see about getting him in next week.       Balta Singer RN

## 2024-06-20 NOTE — TELEPHONE ENCOUNTER
Referral to Acute and Diagnostic Services    [phone] ([place]) [facility] [address] [place] [id number]    Transition to Acute & Diagnostic Services Clinic has been discussed with patient, and he agrees with next level of care.   Patient understands that evaluation/treatment at [facility] typically takes significantly longer than in clinic/urgent care (>2 hours).  The [facility] Diagnostics Services Clinic has been contacted by provider/staff to confirm patient acceptance.         Special issues:      None    Pt is refusing ER but would be willing to be seen in [facility] on Monday or Tuesday next week for a rule out PE eval.             The following provider has assessed this patient for intervention at [facility], and directed the patient for referral: Balta Singer RN

## 2024-06-21 NOTE — TELEPHONE ENCOUNTER
Noted that in spite of multiple attempts to advise patient, he continued to defer or decline opportunities to be evaluated today. He has made clear repeatedly that he would not proceed to the ER, and he could not make it to [facility] for some reason. Due to the above, he may contact the care team next week if he continues to experience shortness of breath, or heh may go to the ER this weekend if his symptoms worsen in the interim.

## 2024-06-21 NOTE — TELEPHONE ENCOUNTER
Patient called and notified, he plans to call on Monday for [facility], so will leave this chart open, he says he will seek the ER if his symptoms worsen.    JACLYN Taylor

## 2024-06-21 NOTE — TELEPHONE ENCOUNTER
Pt called back and has not been seen yet, no changes since triage. He could be seen later today in [facility].  [facility] contacted  Josie Wesley RN on 6/21/2024 at 10:59 AM

## 2024-06-21 NOTE — TELEPHONE ENCOUNTER
Spoke with patient who cannot be here in time for work up today. Advised to go to ER for evaluation. Patient refuses and will check back in with clinic on Monday if symptoms persist.     Betzy METCALF RN  [facility] [place]  [phone]

## 2024-06-21 NOTE — TELEPHONE ENCOUNTER
Per [facility] provider Dr. Mohamud:    Please reach out to this nurse and let her know that we do not schedule appointments days out in advance.  The recommendation for this patient is to be seen in the ER or we could schedule him today.  If he continues to refuse I would recommend to the nurse to reach back out to the patient on Tuesday 6/25 and check-in to see the #1 if he has been seen yet and #2 if he still having symptoms and if that is the case they could reach out to [facility] on Tuesday to get him scheduled.  Thanks  Kathy Mohamud MD       Left message for patient to call care team back, do not see that patient has been seen in the ER per chart review.      JACLYN Taylor

## 2024-06-25 ENCOUNTER — OFFICE VISIT (OUTPATIENT)
Dept: FAMILY MEDICINE | Facility: CLINIC | Age: 54
End: 2024-06-25
Payer: COMMERCIAL

## 2024-06-25 ENCOUNTER — ANCILLARY PROCEDURE (OUTPATIENT)
Dept: GENERAL RADIOLOGY | Facility: CLINIC | Age: 54
End: 2024-06-25
Attending: FAMILY MEDICINE
Payer: COMMERCIAL

## 2024-06-25 VITALS
HEART RATE: 105 BPM | SYSTOLIC BLOOD PRESSURE: 138 MMHG | RESPIRATION RATE: 20 BRPM | DIASTOLIC BLOOD PRESSURE: 86 MMHG | OXYGEN SATURATION: 98 % | WEIGHT: 295 LBS | HEIGHT: 74 IN | TEMPERATURE: 97.2 F | BODY MASS INDEX: 37.86 KG/M2

## 2024-06-25 DIAGNOSIS — F10.21 ALCOHOL DEPENDENCE IN REMISSION (H): ICD-10-CM

## 2024-06-25 DIAGNOSIS — R06.09 DOE (DYSPNEA ON EXERTION): ICD-10-CM

## 2024-06-25 DIAGNOSIS — R06.09 DOE (DYSPNEA ON EXERTION): Primary | ICD-10-CM

## 2024-06-25 LAB
D DIMER PPP FEU-MCNC: 0.37 UG/ML FEU (ref 0–0.5)
ERYTHROCYTE [DISTWIDTH] IN BLOOD BY AUTOMATED COUNT: 12.8 % (ref 10–15)
HCT VFR BLD AUTO: 44.8 % (ref 40–53)
HGB BLD-MCNC: 15.2 G/DL (ref 13.3–17.7)
MCH RBC QN AUTO: 31.9 PG (ref 26.5–33)
MCHC RBC AUTO-ENTMCNC: 33.9 G/DL (ref 31.5–36.5)
MCV RBC AUTO: 94 FL (ref 78–100)
PLATELET # BLD AUTO: 195 10E3/UL (ref 150–450)
RBC # BLD AUTO: 4.77 10E6/UL (ref 4.4–5.9)
WBC # BLD AUTO: 9.4 10E3/UL (ref 4–11)

## 2024-06-25 PROCEDURE — 85379 FIBRIN DEGRADATION QUANT: CPT | Performed by: FAMILY MEDICINE

## 2024-06-25 PROCEDURE — 85027 COMPLETE CBC AUTOMATED: CPT | Performed by: FAMILY MEDICINE

## 2024-06-25 PROCEDURE — 36415 COLL VENOUS BLD VENIPUNCTURE: CPT | Performed by: FAMILY MEDICINE

## 2024-06-25 PROCEDURE — 99214 OFFICE O/P EST MOD 30 MIN: CPT | Performed by: FAMILY MEDICINE

## 2024-06-25 PROCEDURE — 71046 X-RAY EXAM CHEST 2 VIEWS: CPT | Mod: TC | Performed by: RADIOLOGY

## 2024-06-25 ASSESSMENT — PAIN SCALES - GENERAL: PAINLEVEL: NO PAIN (0)

## 2024-06-25 NOTE — TELEPHONE ENCOUNTER
Patient was referred to [facility] on Friday, June 20th, 2024, but stated he was unable to make it to our clinic before it closed due to an event he was attending in [place]. Patient asked if he could get an appointment in [facility] on 6/24/2024. Staff advised patient to go to ER for evaluation at his earliest convenience for SOB due to needing sooner evaluation then waiting the entire weekend. If he was able to wait that long he could be seen in clinic in same day slot. Patient stated he would wait to see how he feels on 6/24/2024 and if he feels he needs to be seen he would call clinic. Central scheduling reached out to [facility] on 6/24/2024 as patient contacted clinic specifically for [facility] appointment. Provider working in [facility] spoke with patient directly and determined his symptoms were appropriate for clinic. Patient has appointment in office on 6/25/2024.    Betzy METCALF RN  [facility]  [phone]

## 2024-06-25 NOTE — PROGRESS NOTES
Assessment & Plan     ZUNIGA (dyspnea on exertion)  Not in distress today. Not hypoxic. Patient also reports the symptom is overall gradually improving but still occurs intermittently.  DDx: inadequate diaphragm descent, reactive airways, low suspect (still possible) PE or dysrhythmia, anemia.  Patient is adamant he does not want to go to ER.  Discussed work up as below - patient agreed to plan. He understands if D dimer is positive, need for more prompt CT chest to rule out occult PE.  May consider trial of rescue bronchodilator. Consider incentive spirometer if inadequate lung expansion.  Return precautions discussed and given to patient.   - CBC with platelets  - D dimer, quantitative  - XR Chest 2 Views  - General PFT Lab (Please always keep checked)  - Pulmonary Function Test  - CBC with platelets  - D dimer, quantitative    Alcohol dependence in remission (H)  Patient verifies not consuming alcohol since surgery       Patient Instructions   You will be contacted in 1-2 days for results of your lab tests and imaging result    For your lung test, if you do not get a call within 2 days, please call [phone]. to schedule.        Norma Barahona is a 54 year old, presenting for the following health issues:  Breathing Problem (X2 months post surgery for rotator cuff repair, with activity or exertion.)        6/25/2024     3:11 PM   Additional Questions   Roomed by Dionne LEMUS MA   Accompanied by self         6/25/2024     3:11 PM   Patient Reported Additional Medications   Patient reports taking the following new medications none     History of Present Illness       Reason for visit:  SOB upon exertion at 2 months post op for rotator cuff repair  Symptom onset:  More than a month  Symptoms include:  Shortness of breath with exertion  Symptom intensity:  Moderate  Symptom progression:  Improving  Had these symptoms before:  No  What makes it worse:  Activity  What makes it better:  NA    He eats 2-3 servings of  "fruits and vegetables daily.He consumes 0 sweetened beverage(s) daily.He exercises with enough effort to increase his heart rate 20 to 29 minutes per day.  He exercises with enough effort to increase his heart rate 7 days per week.   He is taking medications regularly.     Received cervical plexus nerve block for shoulder surgery. Developed dyspnea when placed on operating table but was managed then. Underwent surgery 5/3/2024 and was discharged with no acute complications.  Since then, intermittent feeling of inability to take deep breaths.  Can have asymtpomatic times.  Deneis sharp squeezing chest pain but has had (now resolved) substernal burning sensation.  Patient has been active and not bedridden since surgery.  Rest makes the symptoms improve.    Review of Systems  Constitutional, HEENT, cardiovascular, pulmonary, GI, , musculoskeletal, neuro, skin, endocrine and psych systems are negative, except as otherwise noted.      Objective    /86 (BP Location: Right arm, Patient Position: Sitting, Cuff Size: Adult Large)   Pulse 105   Temp 97.2  F (36.2  C) (Tympanic)   Resp 20   Ht 1.875 m (6' 1.8\")   Wt 133.8 kg (295 lb)   SpO2 98%   BMI 38.08 kg/m    Body mass index is 38.08 kg/m .  Physical Exam   GENERAL: morbidly obese, alert and no distress, ambulatory w/o assist  NECK: no tenderness, no adenopathy,  Thyroid not enlarged  RESP: lungs clear to auscultation - no rales, no rhonchi, no wheezes  CV: HR 90's on auscultation, regular rhythm, no murmur  MS: no edema  SKIN: no suspicious lesions, no rashes, no cyanosis      No results found for any visits on 06/25/24.        Signed Electronically by: Kahlil Denny MD    "

## 2024-06-25 NOTE — PATIENT INSTRUCTIONS
You will be contacted in 1-2 days for results of your lab tests and imaging result    For your lung test, if you do not get a call within 2 days, please call [phone]. to schedule.

## 2024-06-28 ENCOUNTER — HOSPITAL ENCOUNTER (OUTPATIENT)
Dept: RESPIRATORY THERAPY | Facility: CLINIC | Age: 54
Discharge: HOME | End: 2024-06-28
Attending: FAMILY MEDICINE | Admitting: FAMILY MEDICINE
Payer: COMMERCIAL

## 2024-06-28 DIAGNOSIS — R06.09 DOE (DYSPNEA ON EXERTION): ICD-10-CM

## 2024-06-28 PROCEDURE — 94729 DIFFUSING CAPACITY: CPT

## 2024-06-28 PROCEDURE — 94375 RESPIRATORY FLOW VOLUME LOOP: CPT

## 2024-06-28 PROCEDURE — 94726 PLETHYSMOGRAPHY LUNG VOLUMES: CPT

## 2024-06-29 LAB
DLCOCOR-%PRED-PRE: 93 %
DLCOCOR-PRE: 30.15 ML/MIN/MMHG
DLCOUNC-%PRED-PRE: 95 %
DLCOUNC-PRE: 30.65 ML/MIN/MMHG
DLCOUNC-PRED: 32.19 ML/MIN/MMHG
ERV-%PRED-PRE: 42 %
ERV-PRE: 0.8 L
ERV-PRED: 1.87 L
EXPTIME-PRE: 7.91 SEC
FEF2575-%PRED-PRE: 70 %
FEF2575-PRE: 2.48 L/SEC
FEF2575-PRED: 3.53 L/SEC
FEFMAX-%PRED-PRE: 73 %
FEFMAX-PRE: 7.74 L/SEC
FEFMAX-PRED: 10.59 L/SEC
FEV1-%PRED-PRE: 83 %
FEV1-PRE: 3.37 L
FEV1FEV6-PRE: 75 %
FEV1FEV6-PRED: 80 %
FEV1FVC-PRE: 73 %
FEV1FVC-PRED: 79 %
FEV1SVC-PRE: 73 %
FEV1SVC-PRED: 79 %
FIFMAX-PRE: 7.03 L/SEC
FRCPLETH-%PRED-PRE: 83 %
FRCPLETH-PRE: 3.16 L
FRCPLETH-PRED: 3.79 L
FVC-%PRED-PRE: 89 %
FVC-PRE: 4.62 L
FVC-PRED: 5.14 L
IC-%PRED-PRE: 102 %
IC-PRE: 3.85 L
IC-PRED: 3.74 L
RVPLETH-%PRED-PRE: 98 %
RVPLETH-PRE: 2.36 L
RVPLETH-PRED: 2.4 L
TLCPLETH-%PRED-PRE: 88 %
TLCPLETH-PRE: 7.01 L
TLCPLETH-PRED: 7.94 L
VA-%PRED-PRE: 86 %
VA-PRE: 6.56 L
VC-%PRED-PRE: 91 %
VC-PRE: 4.64 L
VC-PRED: 5.1 L

## 2024-07-24 ENCOUNTER — TRANSFERRED RECORDS (OUTPATIENT)
Dept: HEALTH INFORMATION MANAGEMENT | Facility: CLINIC | Age: 54
End: 2024-07-24
Payer: COMMERCIAL

## 2024-09-04 ENCOUNTER — TRANSFERRED RECORDS (OUTPATIENT)
Dept: HEALTH INFORMATION MANAGEMENT | Facility: CLINIC | Age: 54
End: 2024-09-04
Payer: COMMERCIAL

## 2024-09-13 ENCOUNTER — TRANSFERRED RECORDS (OUTPATIENT)
Dept: HEALTH INFORMATION MANAGEMENT | Facility: CLINIC | Age: 54
End: 2024-09-13
Payer: COMMERCIAL

## 2024-10-21 ENCOUNTER — TRANSFERRED RECORDS (OUTPATIENT)
Dept: HEALTH INFORMATION MANAGEMENT | Facility: CLINIC | Age: 54
End: 2024-10-21
Payer: COMMERCIAL

## 2024-12-02 ENCOUNTER — TRANSFERRED RECORDS (OUTPATIENT)
Dept: HEALTH INFORMATION MANAGEMENT | Facility: CLINIC | Age: 54
End: 2024-12-02
Payer: COMMERCIAL

## 2024-12-22 ENCOUNTER — HEALTH MAINTENANCE LETTER (OUTPATIENT)
Age: 54
End: 2024-12-22

## 2025-01-17 ENCOUNTER — TRANSFERRED RECORDS (OUTPATIENT)
Dept: HEALTH INFORMATION MANAGEMENT | Facility: CLINIC | Age: 55
End: 2025-01-17
Payer: COMMERCIAL

## 2025-06-13 ENCOUNTER — TRANSFERRED RECORDS (OUTPATIENT)
Dept: HEALTH INFORMATION MANAGEMENT | Facility: CLINIC | Age: 55
End: 2025-06-13
Payer: COMMERCIAL